# Patient Record
Sex: MALE | Race: WHITE | NOT HISPANIC OR LATINO | Employment: FULL TIME | URBAN - METROPOLITAN AREA
[De-identification: names, ages, dates, MRNs, and addresses within clinical notes are randomized per-mention and may not be internally consistent; named-entity substitution may affect disease eponyms.]

---

## 2017-02-02 ENCOUNTER — OFFICE VISIT - RIVER FALLS (OUTPATIENT)
Dept: FAMILY MEDICINE | Facility: CLINIC | Age: 32
End: 2017-02-02

## 2017-02-02 ASSESSMENT — MIFFLIN-ST. JEOR: SCORE: 1837.54

## 2017-04-26 ENCOUNTER — COMMUNICATION - RIVER FALLS (OUTPATIENT)
Dept: FAMILY MEDICINE | Facility: CLINIC | Age: 32
End: 2017-04-26

## 2017-04-26 ENCOUNTER — OFFICE VISIT - RIVER FALLS (OUTPATIENT)
Dept: FAMILY MEDICINE | Facility: CLINIC | Age: 32
End: 2017-04-26

## 2017-04-26 ASSESSMENT — MIFFLIN-ST. JEOR: SCORE: 1813.04

## 2017-07-24 ENCOUNTER — OFFICE VISIT - RIVER FALLS (OUTPATIENT)
Dept: FAMILY MEDICINE | Facility: CLINIC | Age: 32
End: 2017-07-24

## 2017-07-24 ASSESSMENT — MIFFLIN-ST. JEOR: SCORE: 1800.34

## 2017-10-18 ENCOUNTER — OFFICE VISIT - RIVER FALLS (OUTPATIENT)
Dept: FAMILY MEDICINE | Facility: CLINIC | Age: 32
End: 2017-10-18

## 2017-10-18 ASSESSMENT — MIFFLIN-ST. JEOR: SCORE: 1775.85

## 2017-11-02 ENCOUNTER — RECORDS - HEALTHEAST (OUTPATIENT)
Dept: ADMINISTRATIVE | Facility: OTHER | Age: 32
End: 2017-11-02

## 2017-11-02 ASSESSMENT — MIFFLIN-ST. JEOR: SCORE: 1770.4

## 2017-11-07 ENCOUNTER — SURGERY - HEALTHEAST (OUTPATIENT)
Dept: SURGERY | Facility: CLINIC | Age: 32
End: 2017-11-07

## 2017-11-07 ENCOUNTER — ANESTHESIA - HEALTHEAST (OUTPATIENT)
Dept: SURGERY | Facility: CLINIC | Age: 32
End: 2017-11-07

## 2017-11-07 ASSESSMENT — MIFFLIN-ST. JEOR: SCORE: 1770.4

## 2018-02-05 ENCOUNTER — OFFICE VISIT - RIVER FALLS (OUTPATIENT)
Dept: FAMILY MEDICINE | Facility: CLINIC | Age: 33
End: 2018-02-05

## 2018-02-05 ASSESSMENT — MIFFLIN-ST. JEOR: SCORE: 1881.99

## 2018-04-26 ENCOUNTER — OFFICE VISIT - RIVER FALLS (OUTPATIENT)
Dept: FAMILY MEDICINE | Facility: CLINIC | Age: 33
End: 2018-04-26

## 2018-04-26 ASSESSMENT — MIFFLIN-ST. JEOR: SCORE: 1852.05

## 2018-07-25 ENCOUNTER — OFFICE VISIT - RIVER FALLS (OUTPATIENT)
Dept: FAMILY MEDICINE | Facility: CLINIC | Age: 33
End: 2018-07-25

## 2018-07-25 ASSESSMENT — MIFFLIN-ST. JEOR: SCORE: 1796.71

## 2018-10-31 ENCOUNTER — OFFICE VISIT - RIVER FALLS (OUTPATIENT)
Dept: FAMILY MEDICINE | Facility: CLINIC | Age: 33
End: 2018-10-31

## 2018-10-31 ASSESSMENT — MIFFLIN-ST. JEOR: SCORE: 1806.69

## 2019-04-03 ENCOUNTER — OFFICE VISIT - RIVER FALLS (OUTPATIENT)
Dept: FAMILY MEDICINE | Facility: CLINIC | Age: 34
End: 2019-04-03

## 2019-04-03 ASSESSMENT — MIFFLIN-ST. JEOR: SCORE: 1812.14

## 2019-08-15 ENCOUNTER — OFFICE VISIT - RIVER FALLS (OUTPATIENT)
Dept: FAMILY MEDICINE | Facility: CLINIC | Age: 34
End: 2019-08-15

## 2019-08-15 ASSESSMENT — MIFFLIN-ST. JEOR: SCORE: 1741.37

## 2019-12-26 ENCOUNTER — OFFICE VISIT - RIVER FALLS (OUTPATIENT)
Dept: FAMILY MEDICINE | Facility: CLINIC | Age: 34
End: 2019-12-26

## 2019-12-26 ASSESSMENT — MIFFLIN-ST. JEOR: SCORE: 1773.13

## 2019-12-27 ENCOUNTER — COMMUNICATION - RIVER FALLS (OUTPATIENT)
Dept: FAMILY MEDICINE | Facility: CLINIC | Age: 34
End: 2019-12-27

## 2019-12-27 LAB
A/G RATIO - HISTORICAL: 1.8 (ref 1–2.5)
ALBUMIN SERPL-MCNC: 4.7 GM/DL (ref 3.6–5.1)
ALP SERPL-CCNC: 77 UNIT/L (ref 40–115)
ALT SERPL W P-5'-P-CCNC: 29 UNIT/L (ref 9–46)
AST SERPL W P-5'-P-CCNC: 22 UNIT/L (ref 10–40)
BILIRUB DIRECT SERPL-MCNC: 0.1 MG/DL
BILIRUB INDIRECT SERPL-MCNC: 0.3 MG/DL (ref 0.2–1.2)
BILIRUB SERPL-MCNC: 0.4 MG/DL (ref 0.2–1.2)
GLOBULIN: 2.6 (ref 1.9–3.7)
PROT SERPL-MCNC: 7.3 GM/DL (ref 6.1–8.1)

## 2020-07-23 ENCOUNTER — OFFICE VISIT - RIVER FALLS (OUTPATIENT)
Dept: FAMILY MEDICINE | Facility: CLINIC | Age: 35
End: 2020-07-23

## 2020-07-23 ASSESSMENT — MIFFLIN-ST. JEOR: SCORE: 1807.6

## 2021-02-25 ENCOUNTER — OFFICE VISIT - RIVER FALLS (OUTPATIENT)
Dept: FAMILY MEDICINE | Facility: CLINIC | Age: 36
End: 2021-02-25

## 2021-02-25 ASSESSMENT — MIFFLIN-ST. JEOR: SCORE: 1826.65

## 2021-02-26 ENCOUNTER — COMMUNICATION - RIVER FALLS (OUTPATIENT)
Dept: FAMILY MEDICINE | Facility: CLINIC | Age: 36
End: 2021-02-26

## 2021-02-26 LAB
A/G RATIO - HISTORICAL: 1.9 (ref 1–2.5)
ALBUMIN SERPL-MCNC: 4.7 GM/DL (ref 3.6–5.1)
ALP SERPL-CCNC: 78 UNIT/L (ref 36–130)
ALT SERPL W P-5'-P-CCNC: 24 UNIT/L (ref 9–46)
AST SERPL W P-5'-P-CCNC: 23 UNIT/L (ref 10–40)
BILIRUB DIRECT SERPL-MCNC: 0.1 MG/DL
BILIRUB INDIRECT SERPL-MCNC: 0.2 MG/DL (ref 0.2–1.2)
BILIRUB SERPL-MCNC: 0.3 MG/DL (ref 0.2–1.2)
GLOBULIN: 2.5 (ref 1.9–3.7)
PROT SERPL-MCNC: 7.2 GM/DL (ref 6.1–8.1)

## 2021-05-31 VITALS — WEIGHT: 185 LBS | BODY MASS INDEX: 26.48 KG/M2 | HEIGHT: 70 IN

## 2021-06-14 NOTE — ANESTHESIA PROCEDURE NOTES
Peripheral Block    Patient location during procedure: pre-op  Start time: 11/7/2017 3:30 PM  End time: 11/7/2017 3:35 PM  post-op analgesia per surgeon order as noted in medical record  Staffing:  Performing  Anesthesiologist: VIN ROSALES  Preanesthetic Checklist  Completed: patient identified, site marked, risks, benefits, and alternatives discussed, timeout performed, consent obtained, airway assessed, oxygen available, suction available, emergency drugs available and hand hygiene performed  Peripheral Block  Block type: sciatic, popliteal  Prep: ChloraPrep  Patient position: supine  Patient monitoring: cardiac monitor, continuous pulse oximetry, heart rate and blood pressure  Laterality: left  Injection technique: ultrasound guided    Ultrasound used to visualize needle placement in proximity to nerve being blocked: yes   Permanent ultrasound image captured for medical record    Needle  Needle type: short-bevel   Needle gauge: 21 G  Needle length: 4 in  no peripheral nerve catheter placed  Assessment  Injection assessment: no difficulty with injection, negative aspiration for heme, no paresthesia on injection and incremental injection

## 2021-06-14 NOTE — ANESTHESIA CARE TRANSFER NOTE
Last vitals:   Vitals:    11/07/17 1545   BP: 122/68   Pulse: 80   Resp: 20   Temp:    SpO2: 99%     Patient's level of consciousness is drowsy  Spontaneous respirations: yes  Maintains airway independently: yes  Dentition unchanged: yes  Oropharynx: oropharynx clear of all foreign objects    QCDR Measures:  ASA# 20 - Surgical Safety Checklist: WHO surgical safety checklist completed prior to induction  PQRS# 430 - Adult PONV Prevention: 4558F - Pt received => 2 anti-emetic agents (different classes) preop & intraop  ASA# 8 - Peds PONV Prevention: 4558F - Pt received => 2 anti-emetic agents (different classes) preop & intraop  PQRS# 424 - Billie-op Temp Management: 4559F - At least one body temp DOCUMENTED => 35.5C or 95.9F within required timeframe  PQRS# 426 - PACU Transfer Protocol: - Transfer of care checklist used  ASA# 14 - Acute Post-op Pain: ASA14B - Patient did NOT experience pain >= 7 out of 10

## 2021-06-14 NOTE — ANESTHESIA PREPROCEDURE EVALUATION
Anesthesia Evaluation      Patient summary reviewed     Airway   Mallampati: II   Pulmonary - negative ROS and normal exam                          Cardiovascular - negative ROS and normal exam   Neuro/Psych - negative ROS     Endo/Other - negative ROS      GI/Hepatic/Renal - negative ROS           Dental                         Anesthesia Plan  Planned anesthetic: general LMA and peripheral nerve block    ASA 1     Anesthetic plan and risks discussed with: patient            Yony De Guzman [place]

## 2021-06-14 NOTE — ANESTHESIA POSTPROCEDURE EVALUATION
Patient: Chung Cordero  OPEN REDUCTION INTERNAL FIXATION CALCANEAL FRACTURE LEFT FOOT  Anesthesia type: general    Patient location: PACU  Last vitals:   Vitals:    11/07/17 1922   BP: 118/64   Pulse: 88   Resp: 16   Temp: 37.1  C (98.7  F)   SpO2: 96%     Post vital signs: stable  Level of consciousness: awake and responds to simple questions  Post-anesthesia pain: pain controlled  Post-anesthesia nausea and vomiting: no  Pulmonary: unassisted, return to baseline  Cardiovascular: stable and blood pressure at baseline  Hydration: adequate  Anesthetic events: no    QCDR Measures:  ASA# 11 - Billie-op Cardiac Arrest: ASA11B - Patient did NOT experience unanticipated cardiac arrest  ASA# 12 - Billie-op Mortality Rate: ASA12B - Patient did NOT die  ASA# 13 - PACU Re-Intubation Rate: ASA13B - Patient did NOT require a new airway mgmt  ASA# 10 - Composite Anes Safety: ASA10A - No serious adverse event    Additional Notes:    Yony Ghotra

## 2022-02-12 VITALS
SYSTOLIC BLOOD PRESSURE: 110 MMHG | WEIGHT: 191.6 LBS | HEART RATE: 76 BPM | HEIGHT: 70 IN | BODY MASS INDEX: 27.43 KG/M2 | DIASTOLIC BLOOD PRESSURE: 70 MMHG | TEMPERATURE: 98.3 F

## 2022-02-12 VITALS
WEIGHT: 193.2 LBS | BODY MASS INDEX: 27.66 KG/M2 | HEART RATE: 76 BPM | HEIGHT: 70 IN | SYSTOLIC BLOOD PRESSURE: 118 MMHG | DIASTOLIC BLOOD PRESSURE: 66 MMHG | TEMPERATURE: 98.6 F

## 2022-02-12 VITALS
HEIGHT: 70 IN | SYSTOLIC BLOOD PRESSURE: 124 MMHG | WEIGHT: 199.8 LBS | TEMPERATURE: 98.4 F | BODY MASS INDEX: 28.6 KG/M2 | HEART RATE: 88 BPM | DIASTOLIC BLOOD PRESSURE: 80 MMHG

## 2022-02-12 VITALS
HEART RATE: 80 BPM | HEIGHT: 70 IN | TEMPERATURE: 98 F | SYSTOLIC BLOOD PRESSURE: 118 MMHG | BODY MASS INDEX: 28.26 KG/M2 | DIASTOLIC BLOOD PRESSURE: 76 MMHG | WEIGHT: 197.4 LBS

## 2022-02-12 VITALS
DIASTOLIC BLOOD PRESSURE: 68 MMHG | BODY MASS INDEX: 27.83 KG/M2 | HEIGHT: 70 IN | TEMPERATURE: 98.3 F | WEIGHT: 194.4 LBS | HEART RATE: 78 BPM | SYSTOLIC BLOOD PRESSURE: 124 MMHG

## 2022-02-12 VITALS
DIASTOLIC BLOOD PRESSURE: 78 MMHG | WEIGHT: 209.6 LBS | HEART RATE: 78 BPM | BODY MASS INDEX: 30.01 KG/M2 | TEMPERATURE: 98.4 F | SYSTOLIC BLOOD PRESSURE: 138 MMHG | HEIGHT: 70 IN

## 2022-02-12 VITALS
TEMPERATURE: 98.3 F | BODY MASS INDEX: 29.06 KG/M2 | SYSTOLIC BLOOD PRESSURE: 114 MMHG | WEIGHT: 203 LBS | DIASTOLIC BLOOD PRESSURE: 72 MMHG | HEIGHT: 70 IN | HEART RATE: 80 BPM | RESPIRATION RATE: 16 BRPM

## 2022-02-12 VITALS
TEMPERATURE: 98.1 F | SYSTOLIC BLOOD PRESSURE: 122 MMHG | BODY MASS INDEX: 27.32 KG/M2 | WEIGHT: 190.8 LBS | HEART RATE: 78 BPM | HEIGHT: 70 IN | DIASTOLIC BLOOD PRESSURE: 70 MMHG

## 2022-02-12 VITALS
HEART RATE: 76 BPM | DIASTOLIC BLOOD PRESSURE: 70 MMHG | TEMPERATURE: 98.1 F | WEIGHT: 178.6 LBS | HEIGHT: 70 IN | SYSTOLIC BLOOD PRESSURE: 118 MMHG | BODY MASS INDEX: 25.57 KG/M2

## 2022-02-12 VITALS
WEIGHT: 185.6 LBS | TEMPERATURE: 98.4 F | HEART RATE: 78 BPM | SYSTOLIC BLOOD PRESSURE: 120 MMHG | BODY MASS INDEX: 26.57 KG/M2 | HEIGHT: 70 IN | DIASTOLIC BLOOD PRESSURE: 60 MMHG

## 2022-02-12 VITALS
WEIGHT: 186.2 LBS | HEART RATE: 84 BPM | HEIGHT: 70 IN | DIASTOLIC BLOOD PRESSURE: 82 MMHG | BODY MASS INDEX: 26.66 KG/M2 | SYSTOLIC BLOOD PRESSURE: 118 MMHG | TEMPERATURE: 98.6 F

## 2022-02-12 VITALS
WEIGHT: 194.2 LBS | HEIGHT: 70 IN | SYSTOLIC BLOOD PRESSURE: 120 MMHG | HEART RATE: 78 BPM | BODY MASS INDEX: 27.8 KG/M2 | TEMPERATURE: 98.2 F | DIASTOLIC BLOOD PRESSURE: 68 MMHG

## 2022-02-12 VITALS
TEMPERATURE: 98.2 F | WEIGHT: 193 LBS | HEART RATE: 76 BPM | BODY MASS INDEX: 27.63 KG/M2 | DIASTOLIC BLOOD PRESSURE: 76 MMHG | HEIGHT: 70 IN | SYSTOLIC BLOOD PRESSURE: 130 MMHG

## 2022-02-16 NOTE — TELEPHONE ENCOUNTER
---------------------  From: Can Bowers MD   To: Phone Messages Pool ([id number]_[facility]); Charisma Nieto MA;     Sent: 2/17/2021 2:21:54 PM CST  Subject: RE: General Message     Yes      ---------------------  From: Charisma Jacobs ([facility] Message Pool ([id number]_[facility]))   To: Can Bowers MD;     Sent: 2/17/2021 1:36:00 PM CST  Subject: FW: General Message           ---------------------  From: Cristina Rodríguez   To: [employer] Message Pool ([id number]);     Sent: 2/17/2021 1:21:08 PM CST  Subject: General Message     This pt is scheduled for a Suboxone check - he has not been seen since July of 2020 - is he still ok to be seen for the Suboxone?---------------------  From: Tamika Stevens LPN (Phone Messages Pool ([id number]_[facility]))   To: Appointment Pool ([id number]_[place]);     Sent: 2/17/2021 2:25:53 PM CST  Subject: FW: General Message---------------------  From: Charisma Jacobs   To: [employer] Message Pool ([id number]);     Sent: 2/18/2021 9:03:36 AM CST  Subject: FW: General Message

## 2022-02-16 NOTE — PROGRESS NOTES
Patient:   FLORY MEANS            MRN: [id number]            FIN: [id number]               Age:   33 years     Sex:  Male     :  1985   Associated Diagnoses:   Chemical Dependency; Bipolar Disorder   Author:   Can Bowers MD      Visit Information   Opiate addiction with oxycontin and percocet. Started Suboxone 2008 at 24mg daily. Tried to taper in 2009 but returned to 24mg daily. Became fatigued with first taper. Started taper from 16mg in 2011.    Admitted marijuana use 2013  Random urine: normal 2013; 2013; May 2014; 2014      Date of Service: 10/31/2018 03:34 pm  Performing Location: [facility]  Encounter#: [id number]      Primary Care Provider (PCP):  Can Bowers MD    NPI# [id number]      Referring Provider:  Can Bowers MD    NPI# [id number]      Chief Complaint   10/31/2018 3:35 PM CDT   Suboxone check      History of Present Illness   Cuts each tab into 14 pieces and takes two pieces a day (1 in morning and 1 at night).Taking less than 0.5 mg daily. Has adjusted to the dose. Working later now that not working for himself. Hours at work are 8-5pm. Took suboxone today.  Restarted 2mg 2014.   Feels better on wellbutrin and lamictal as opposed to the prozac and lithium. Not having the ups and downs throughout the day as much.  Smoking 1/2 ppd.  Mind racing controlled with lamictal. Temper decreased with wellbutrin.  Back to exercising.  Started  working for dealership. Now joined [employer] and working [employer] 2018.    Consulted with Dr Lama (May 11th, 2015) and recommends inpatient treatment to start vivitrol.     Mom (Magdalena Means) present for the visit 2015    Needed percocet and vicodin for postoperative pain related to Calcaneus fracture. Happened 2017. Now back to full activities         Review of Systems   Constitutional:  sleeping good.    Gastrointestinal:   No abdominal pain.    Neurologic:  No headache.    Psychiatric:  mom has noticed he gets distracted and gets distracted at work; did fine in school growing up; has used a light in past for seasonal affective disorder.      PHQ-9: 5pts (October 2018). 5pts (October 2017). 3pts (April 2017). 5pts (March 2016). 3pts (January 2015); 11pts (May 2014); 5pts (May 2013). 8pts April 2012; 7 pts February 2012. 5pts November 2011. 6 pts (July 2011). 13pts (May 2011) . 5pts March 2010  MDQ:  0pts and no problem (October 2018). 2pts and no problem (October 2017). 1pt and no problem (March 2016). 0pts and minor problem (March 2015). 1 pt and no problem(January 2015). 1pt and minor problem (May 2014).  2pts (May 2013). 2pts April 2012; 1pt February 2012. 2pts May 2011. 2pts March 2010  DAVON-7: 1pt (October 2018). 4pts (October 2017)      Health Status   Allergies:    Allergic Reactions (Selected)  No known allergies   Medications:  (Selected)   Prescriptions  Prescribed  Suboxone 2 mg-0.5 mg sublingual film: 0.125-0.25 EA, sl, daily, # 4 EA, 2 Refill(s), Type: Maintenance, Pharmacy: [facility], Called to [facility], 0.125-0.25 EA SL daily  buPROPion 300 mg/24 hours (XL) oral tablet, extended release: = 1 tab(s) ( 300 mg ), po, q 24 hrs, # 95 tab(s), 3 Refill(s), Type: Maintenance, Pharmacy: [facility], 1 tab(s) Oral q 24 hrs  lamoTRIgine 200 mg oral tablet: = 1 tab(s) ( 200 mg ), po, daily, # 95 tab(s), 3 Refill(s), Type: Maintenance, Pharmacy: [facility], 1 tab(s) Oral daily   Problem list:    All Problems  Chemical dependency / SNOMED CT 669037452 / Confirmed  Bipolar disorder / SNOMED CT 1656532323 / Confirmed  Obese / SNOMED CT 2050570616 / Probable  Recurrent depressive disorder / SNOMED CT 118551304 / Confirmed  Resolved: Right middle finger tendon repair      Histories   Procedure history:    Right middle finger tendon repair in the month of 12/2005 at 20 Years.     Family History:  "Parents alive and healthy. Sister healthy (two children)    Social History: Works for [employer] ([employer]) April 2018. 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  and wife has history of meth abuse in high school but clean since 2001. Son born 2012. Have been trying to have a child for a few years      Physical Examination   Vital Signs   10/31/2018 3:35 PM CDT Temperature Tympanic 98.2 DegF    Peripheral Pulse Rate 76 bpm    Pulse Site Radial artery    HR Method Manual    Systolic Blood Pressure 130 mmHg    Diastolic Blood Pressure 76 mmHg    Mean Arterial Pressure 94 mmHg    BP Site Right arm    BP Method Manual      Measurements from flowsheet : Measurements   10/31/2018 3:35 PM CDT Height Measured - Standard 70 in    Weight Measured - Standard 193 lb    BSA 2.08 m2    Body Mass Index 27.69 kg/m2  HI      General:  Alert and oriented, No acute distress.    Musculoskeletal:  Normal gait.    Psychiatric:  Appropriate mood & affect.       Review / Management   Normal LFT July 2017  Reviewed [place] Drug Px Monitoring Program (October 2018)  Urine drug screen: negative for buprenorphines since on such low dose      Impression and Plan   Diagnosis     Chemical Dependency (TIH34-ZW F19.20).     Bipolar Disorder (WTD65-TJ F31.9).       Chemical Dependency: continue 4 tabs today for 4 months. Consider reduction to 3 tabs in 2019. Has decreased from 15 tabs a month (March 2016) and 5 tabs a month (July 2017). Will check urine test 1-2x a year. Check LFT's in November  Bipolar Disorder: continue lamictal and wellbutrin as going well. Decreased Wellbutrin to 300mg daily July 2017 and doing well. Stopped prozac September 2014 (had been on it since 2011) and lithium.    Continues to lose weight from high of 250 lbs. Gained weight with nonweight bearing status for 2 months.  "

## 2022-02-16 NOTE — NURSING NOTE
Comprehensive Intake Entered On:  4/3/2019 5:08 PM CDT    Performed On:  4/3/2019 5:04 PM CDT by Bette Sims CMA               Summary   Chief Complaint :   Medication management- Suboxone   Weight Measured :   194.2 lb(Converted to: 194 lb 3 oz, 88.09 kg)    Height Measured :   70 in(Converted to: 5 ft 10 in, 177.80 cm)    Body Mass Index :   27.86 kg/m2 (HI)    Body Surface Area :   2.08 m2   Systolic Blood Pressure :   120 mmHg   Diastolic Blood Pressure :   68 mmHg   Mean Arterial Pressure :   85 mmHg   Peripheral Pulse Rate :   78 bpm   BP Site :   Right arm   Pulse Site :   Radial artery   BP Method :   Manual   HR Method :   Manual   Temperature Tympanic :   98.2 DegF(Converted to: 36.8 DegC)    Bette Sims CMA - 4/3/2019 5:04 PM CDT   Health Status   Allergies Verified? :   Yes   Medication History Verified? :   Yes   Medical History Verified? :   No   Pre-Visit Planning Status :   Completed   Tobacco Use? :   Current every day smoker   Bette Sims CMA - 4/3/2019 5:04 PM CDT   Demographics   Last Name :   Rajanjuwan   Address :   [address] [place] [address]    First Name :   Chung   Kat Initial :   L   Responsible Party Date of Birth () :   1985 CDT   City :   [place]   State :   [place]   Zip Code :   [address]   Bette Sims CMA - 4/3/2019 5:04 PM CDT   Providers Grid   Provider Name :    Rony Rosas              Provider Specialty :    Counselor at [facility]              Comments :    # [phone]                Bette Sims CMA - 4/3/2019 5:04 PM CDT         Meds / Allergies   (As Of: 4/3/2019 5:08:10 PM CDT)   Allergies (Active)   No known allergies  Estimated Onset Date:   Unspecified ; Created By:   Aurora Garcia; Reaction Status:   Active ; Category:   Drug ; Substance:   No known allergies ; Type:   Allergy ; Updated By:   Aurora Garcia; Reviewed Date:   4/3/2019 5:06 PM CDT        Medication List   (As Of: 4/3/2019 5:08:10 PM CDT)    Prescription/Discharge Order    buprenorphine-naloxone  :   buprenorphine-naloxone ; Status:   Prescribed ; Ordered As Mnemonic:   Suboxone 2 mg-0.5 mg sublingual film ; Simple Display Line:   0.125-0.25 EA, sl, daily, 4 EA, 3 Refill(s) ; Ordering Provider:   Can Bowers MD; Catalog Code:   buprenorphine-naloxone ; Order Dt/Tm:   10/31/2018 3:57:37 PM          buPROPion  :   buPROPion ; Status:   Prescribed ; Ordered As Mnemonic:   buPROPion 300 mg/24 hours (XL) oral tablet, extended release ; Simple Display Line:   300 mg, 1 tab(s), po, q 24 hrs, 95 tab(s), 3 Refill(s) ; Ordering Provider:   Can Bowers MD; Catalog Code:   buPROPion ; Order Dt/Tm:   9/5/2018 12:28:59 PM          lamoTRIgine  :   lamoTRIgine ; Status:   Prescribed ; Ordered As Mnemonic:   lamoTRIgine 200 mg oral tablet ; Simple Display Line:   200 mg, 1 tab(s), po, daily, 95 tab(s), 3 Refill(s) ; Ordering Provider:   Can Bowers MD; Catalog Code:   lamoTRIgine ; Order Dt/Tm:   9/5/2018 12:29:17 PM

## 2022-02-16 NOTE — PROGRESS NOTES
Patient:   FLORY MEANS            MRN: [id number]            FIN: [id number]               Age:   32 years     Sex:  Male     :  1985   Associated Diagnoses:   Chemical Dependency; Bipolar Disorder   Author:   Can Bowers MD      Visit Information   Opiate addiction with oxycontin and percocet. Started Suboxone 2008 at 24mg daily. Tried to taper in 2009 but returned to 24mg daily. Became fatigued with first taper. Started taper from 16mg in 2011.    Admitted marijuana use 2013  Random urine: normal 2013; 2013; May 2014; 2014      Date of Service: 2018 08:32 am  Performing Location: [facility]  Encounter#: [id number]      Primary Care Provider (PCP):  Can Bowers MD    NPI# [id number]      Referring Provider:  Can Bowers MD    NPI# [id number]      Chief Complaint   2018 8:34 AM CST     Suboxone check        History of Present Illness   Cuts each tab into 14 pieces and takes 2 pieces a day (1 in morning and 1 at night).Taking less than 0.5 mg daily. Has adjusted to the dose. Working later now that not working for himself. Hours at work are 8-5pm. Currently off work. Took suboxone today.  Restarted 2mg 2014.   Feels better on wellbutrin and lamictal as opposed to the prozac and lithium. Not having the ups and downs throughout the day as much.  Smoking 3/4ppd.  Mind racing controlled with lamictal. Temper decreased with wellbutrin.  Back to exercising.  Started  working for dealership. Now working at [employer]    Consulted with Dr Lama (May 11th, 2015) and recommends inpatient treatment to start vivitrol.     Mom (Magdalena Means) present for the visit 2015    Needed percocet and vicodin for postoperative pain related to Calcaneus fracture. Happened 2017 and not currently back to work.         Review of Systems   Constitutional:  sleeping good.    Gastrointestinal:  No abdominal  "pain.    Neurologic:  No headache.    Psychiatric:  mom has noticed he gets distracted and gets distracted at work; did fine in school growing up; has used a light in past for seasonal affective disorder.      PHQ-9: 5pts (October 2017). 3pts (April 2017). 5pts (March 2016). 3pts (January 2015); 11pts (May 2014); 5pts (May 2013). 8pts April 2012; 7 pts February 2012. 5pts November 2011. 6 pts (July 2011). 13pts (May 2011) . 5pts March 2010  MDQ:  2pts and no problem (October 2017). 1pt and no problem (March 2016). 0pts and minor problem (March 2015). 1 pt and no problem(January 2015). 1pt and minor problem (May 2014).  2pts (May 2013). 2pts April 2012; 1pt February 2012. 2pts May 2011. 2pts March 2010  DAVON-7: 4pts (October 2017)      Health Status   Allergies:    Allergic Reactions (Selected)  No known allergies   Medications:  (Selected)   Prescriptions  Prescribed  Suboxone 2 mg-0.5 mg sublingual film: 0.125-0.25 EA, sl, daily, # 4 EA, 2 Refill(s), Type: Maintenance, Pharmacy: [facility], [facility], 0.125-0.25 EA sl daily  buPROPion 300 mg/24 hours (XL) oral tablet, extended release: 1 tab(s) ( 300 mg ), po, q 24 hrs, # 30 tab(s), 1 Refill(s), Type: Maintenance, Pharmacy: [id number], 1 tab(s) po q 24 hrs  lamoTRIgine 200 mg oral tablet: 1 tab(s) ( 200 mg ), po, daily, # 95 tab(s), 3 Refill(s), Type: Maintenance, Pharmacy: [facility], 1 tab(s) po daily   Problem list:    All Problems  Chemical dependency / SNOMED CT 779803287 / Confirmed  Bipolar disorder / SNOMED CT 5501800458 / Confirmed  Obese / SNOMED CT 1463213580 / Probable  Recurrent depressive disorder / SNOMED CT 865238177 / Confirmed  Resolved: Right middle finger tendon repair      Histories   Procedure history:    Right middle finger tendon repair in the month of 12/2005 at 20 Years.   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,  and wife has " history of meth abuse in high school but clean since 2001      Physical Examination   Vital Signs   2/5/2018 8:34 AM CST Temperature Tympanic 98.4 DegF    Peripheral Pulse Rate 78 bpm    Pulse Site Radial artery    HR Method Manual    Systolic Blood Pressure 138 mmHg  HI    Diastolic Blood Pressure 78 mmHg    Mean Arterial Pressure 98 mmHg    BP Site Right arm    BP Method Manual      Measurements from flowsheet : Measurements   2/5/2018 8:34 AM CST Height Measured - Standard 70 in    Weight Measured - Standard 209.6 lb    BSA 2.16 m2    Body Mass Index 30.07 kg/m2  HI      General:  Alert and oriented, No acute distress.    Musculoskeletal:  Normal gait.    Psychiatric:  Appropriate mood & affect.       Review / Management   Normal LFT July 2017  Reviewed [place] Drug Px Monitoring Program (February 2018)  Urine drug screen: positive for buprenorphines (July 2017)      Impression and Plan   Diagnosis     Chemical Dependency (FWM23-VY F19.20).     Bipolar Disorder (IQL74-NW F31.9).       Chemical Dependency: continue 4 tabs today for 3 months. Consider reduction to 3 tabs in 2018. Has decreased from 15 tabs a month (March 2016).   Bipolar Disorder: continue lamictal and wellbutrin as going well. Decrease Wellbutrin to 300mg daily. Stopped prozac September 2014 (had been on it since 2011) and lithium.    Continues to lose weight from high of 250 lbs. Gained weight with nonweight bearing status for 2 months.

## 2022-02-16 NOTE — NURSING NOTE
Comprehensive Intake Entered On:  7/23/2020 3:54 PM CDT    Performed On:  7/23/2020 3:51 PM CDT by Bette Sims CMA               Summary   Chief Complaint :   Suboxone check   Weight Measured :   193.2 lb(Converted to: 193 lb 3 oz, 87.63 kg)    Height Measured :   70 in(Converted to: 5 ft 10 in, 177.80 cm)    Body Mass Index :   27.72 kg/m2 (HI)    Body Surface Area :   2.08 m2   Systolic Blood Pressure :   118 mmHg   Diastolic Blood Pressure :   66 mmHg   Mean Arterial Pressure :   83 mmHg   Peripheral Pulse Rate :   76 bpm   BP Site :   Right arm   Pulse Site :   Radial artery   BP Method :   Manual   HR Method :   Manual   Temperature Tympanic :   98.6 DegF(Converted to: 37.0 DegC)    Bette Sims CMA - 7/23/2020 3:51 PM CDT   Health Status   Allergies Verified? :   Yes   Medication History Verified? :   Yes   Medical History Verified? :   No   Pre-Visit Planning Status :   Completed   Tobacco Use? :   Current every day smoker   Bette Sims CMA - 7/23/2020 3:51 PM CDT   Consents   Consent for Immunization Exchange :   Consent Granted   Consent for Immunizations to Providers :   Consent Granted   Bette Sims CMA - 7/23/2020 3:51 PM CDT   Meds / Allergies   (As Of: 7/23/2020 3:54:55 PM CDT)   Allergies (Active)   No known allergies  Estimated Onset Date:   Unspecified ; Created By:   Aurora Garcia; Reaction Status:   Active ; Category:   Drug ; Substance:   No known allergies ; Type:   Allergy ; Updated By:   Aurora Garcia; Reviewed Date:   7/23/2020 3:52 PM CDT        Medication List   (As Of: 7/23/2020 3:54:55 PM CDT)   Prescription/Discharge Order    buprenorphine-naloxone  :   buprenorphine-naloxone ; Status:   Prescribed ; Ordered As Mnemonic:   Suboxone 2 mg-0.5 mg sublingual film ; Simple Display Line:   0.075-0.25 EA, sl, daily, 3 EA, 5 Refill(s) ; Ordering Provider:   Can Bowers MD; Catalog Code:   buprenorphine-naloxone ; Order Dt/Tm:   12/26/2019 4:07:12 PM CST           buPROPion  :   buPROPion ; Status:   Prescribed ; Ordered As Mnemonic:   buPROPion 300 mg/24 hours (XL) oral tablet, extended release ; Simple Display Line:   300 mg, 1 tab(s), po, q 24 hrs, 95 tab(s), 3 Refill(s) ; Ordering Provider:   Can Bowers MD; Catalog Code:   buPROPion ; Order Dt/Tm:   8/15/2019 4:37:08 PM CDT          lamoTRIgine  :   lamoTRIgine ; Status:   Prescribed ; Ordered As Mnemonic:   lamoTRIgine 200 mg oral tablet ; Simple Display Line:   200 mg, 1 tab(s), po, daily, 95 tab(s), 3 Refill(s) ; Ordering Provider:   Can Bowers MD; Catalog Code:   lamoTRIgine ; Order Dt/Tm:   8/15/2019 4:37:01 PM CDT            ID Risk Screen   Recent Travel History :   No recent travel   Family Member Travel History :   No recent travel   Other Exposure to Infectious Disease :   Unknown   Bette Sims CMA - 7/23/2020 3:51 PM CDT   Procedures / Surgeries        -    Procedure History   (As Of: 7/23/2020 3:54:55 PM CDT)     Procedure Dt/Tm:   12/2005 ; Anesthesia Minutes:   0 ; Procedure Name:   Right middle finger tendon repair ; Procedure Minutes:   0

## 2022-02-16 NOTE — PROGRESS NOTES
Patient:   FLORY MEANS            MRN: [id number]            FIN: [id number]               Age:   32 years     Sex:  Male     :  1985   Associated Diagnoses:   Chemical Dependency; Bipolar Disorder   Author:   Can Bowers MD      Visit Information   Opiate addiction with oxycontin and percocet. Started Suboxone 2008 at 24mg daily. Tried to taper in 2009 but returned to 24mg daily. Became fatigued with first taper. Started taper from 16mg in 2011.    Admitted marijuana use 2013  Random urine: normal 2013; 2013; May 2014; 2014      Date of Service: 2017 03:28 pm  Performing Location: [facility]  Encounter#: [id number]      Primary Care Provider (PCP):  Can Bowers MD    NPI# [id number]      Referring Provider:  Can Bowers MD    NPI# [id number]      Chief Complaint   2017 3:41 PM CDT    suboxone recheck.      History of Present Illness   Cuts each tab into 6 pieces and takes 1 piece a day (1/2 in morning and 1/2 at night).Taking less than 0.5 mg daily. Has adjusted to the dose.Working later now that not working for himself. Hours at work are 8-5pm. Job going well. Took suboxone today.  Restarted 2mg 2014.   Feels better on wellbutrin and lamictal as opposed to the prozac and lithium. Not having the ups and downs throughout the day as much.  Tried patches and cut down to 1/2 ppd but now back to 1ppd.  Mind racing controlled with lamictal. Temper decreased with wellbutrin.  Exercises every morning and feels that helps him mentally. Continues to lose weight.  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     Mom (Magdalena Means) present for the visit 2015         Review of Systems   Constitutional:  sleeping good.    Gastrointestinal:  No abdominal pain.    Neurologic:  No headache.     Psychiatric:  mom has noticed he gets distracted and gets distracted at work; did fine in school growing up; has used a light in past for seasonal affective disorder.      PHQ-9: 3pts (April 2017). 5pts (March 2016). 3pts (January 2015); 11pts (May 2014); 5pts (May 2013). 8pts April 2012; 7 pts February 2012. 5pts November 2011. 6 pts (July 2011). 13pts (May 2011) . 5pts March 2010  MDQ:  1pt and no problem (March 2016). 0pts and minor problem (March 2015). 1 pt and no problem(January 2015). 1pt and minor problem (May 2014).  2pts (May 2013). 2pts April 2012; 1pt February 2012. 2pts May 2011. 2pts March 2010             Health Status   Allergies:    Allergic Reactions (Selected)  No known allergies   Medications:  (Selected)   Prescriptions  Prescribed  Suboxone 2 mg-0.5 mg sublingual film: 0.125-0.25 EA, sl, daily, # 5 EA, 2 Refill(s), Type: Maintenance, [facility], [facility], 0.125-0.25 EA sl daily  buPROPion 150 mg/24 hours (XL) oral tablet, extended release: 3 tab(s) ( 450 mg ), po, q 24 hrs, # 280 tab(s), 3 Refill(s), Type: Maintenance, Pharmacy: [facility], 3 tab(s) po q 24 hrs  lamoTRIgine 200 mg oral tablet: 1 tab(s) ( 200 mg ), po, daily, Inst[facility], # 95 tab(s), 3 Refill(s), Type: Maintenance, Pharmacy: [facility], 1 tab(s) po daily,[facility]   Problem list:    All Problems  Chemical dependency / SNOMED CT 245870795 / Confirmed  Bipolar disorder / SNOMED CT 0434501952 / Confirmed  Obese / SNOMED CT 9509658686 / Probable  Recurrent depressive disorder / SNOMED CT 202328223 / Confirmed  Resolved: Right middle finger tendon repair      Histories   Procedure history:    Right middle finger tendon repair in the month of 12/2005 at 20 Years.   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    Musculoskeletal:  Normal gait.    Psychiatric:  Appropriate mood & affect.       Review / Management   Normal LFT May 2016  Reviewed [place] Drug Px Monitoring Program (July 2017)  Urine drug screen: positive for buprenorphines (July 2017)         Impression and Plan   Diagnosis     Chemical Dependency (GTL23-UO F19.20).     Bipolar Disorder (TVB47-BD F31.9).       Chemical Dependency: decrease to 4 tabs today (switched jobs recently) for 3 months. Consider reduction to 3 tabs in 2018. Follow up end of October. Has decreased from 15 tabs a month a year ago (March 2016)  Bipolar Disorder: continue lamictal and wellbutrin as going well. Decrease Wellbutrin to 300mg daily. Stopped prozac September 2014 (had been on it since 2011) and lithium.

## 2022-02-16 NOTE — PROGRESS NOTES
Patient:   FLORY MEANS            MRN: [id number]            FIN: [id number]               Age:   33 years     Sex:  Male     :  1985   Associated Diagnoses:   Chemical Dependency; Bipolar Disorder   Author:   Can Bowers MD      Visit Information   Opiate addiction with oxycontin and percocet. Started Suboxone 2008 at 24mg daily. Tried to taper in 2009 but returned to 24mg daily. Became fatigued with first taper. Started taper from 16mg in 2011.    Admitted marijuana use 2013  Random urine: normal 2013; 2013; May 2014; 2014      Date of Service: 2018 12:49 pm  Performing Location: [facility]  Encounter#: [id number]      Primary Care Provider (PCP):  Can Bowers MD    NPI# [id number]      Referring Provider:  Can Bowers MD    NPI# [id number]      Chief Complaint   2018 12:53 PM CDT   Pt here for a Suboxone recheck.        History of Present Illness   Cuts each tab into 14 pieces and takes 2 pieces a day (1 in morning and 1 at night).Taking less than 0.5 mg daily. Has adjusted to the dose. Working later now that not working for himself. Hours at work are 8-5pm. Currently off work. Took suboxone today.  Restarted 2mg 2014.   Feels better on wellbutrin and lamictal as opposed to the prozac and lithium. Not having the ups and downs throughout the day as much.  Smoking 3/4ppd.  Mind racing controlled with lamictal. Temper decreased with wellbutrin.  Back to exercising.  Started  working for dealership. Now working at the [employer]. 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    Consulted with Dr Lama (May 11th, 2015) and recommends inpatient treatment to start vivitrol.     Mom (Magdalena Means) present for the visit 2015    Needed percocet and vicodin for postoperative pain related to Calcaneus fracture. Happened 2017. Now back to full activities         Review  of Systems   Constitutional:  sleeping good.    Gastrointestinal:  No abdominal pain.    Neurologic:  No headache.    Psychiatric:  mom has noticed he gets distracted and gets distracted at work; did fine in school growing up; has used a light in past for seasonal affective disorder.      PHQ-9: 5pts (October 2017). 3pts (April 2017). 5pts (March 2016). 3pts (January 2015); 11pts (May 2014); 5pts (May 2013). 8pts April 2012; 7 pts February 2012. 5pts November 2011. 6 pts (July 2011). 13pts (May 2011) . 5pts March 2010  MDQ:  2pts and no problem (October 2017). 1pt and no problem (March 2016). 0pts and minor problem (March 2015). 1 pt and no problem(January 2015). 1pt and minor problem (May 2014).  2pts (May 2013). 2pts April 2012; 1pt February 2012. 2pts May 2011. 2pts March 2010  DAVON-7: 4pts (October 2017)      Health Status   Allergies:    Allergic Reactions (Selected)  No known allergies   Medications:  (Selected)   Prescriptions  Prescribed  Suboxone 2 mg-0.5 mg sublingual film: 0.125-0.25 EA, sl, daily, # 4 EA, 2 Refill(s), Type: Maintenance, Pharmacy: [facility], [facility], 0.125-0.25 EA sl daily  buPROPion 300 mg/24 hours (XL) oral tablet, extended release: 1 tab(s) ( 300 mg ), po, q 24 hrs, # 30 tab(s), 1 Refill(s), Type: Maintenance, Pharmacy: [id number], 1 tab(s) po q 24 hrs  lamoTRIgine 200 mg oral tablet: 1 tab(s) ( 200 mg ), po, daily, # 95 tab(s), 3 Refill(s), Type: Maintenance, Pharmacy: [facility], 1 tab(s) po daily   Problem list:    All Problems  Chemical dependency / SNOMED CT 172055205 / Confirmed  Bipolar disorder / SNOMED CT 9885840373 / Confirmed  Obese / SNOMED CT 6188011897 / Probable  Recurrent depressive disorder / SNOMED CT 691035057 / Confirmed  Resolved: Right middle finger tendon repair      Histories   Procedure history:    Right middle finger tendon repair in the month of 12/2005 at 20 Years.   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 \"[employer]\" in September 2010,  and wife has history of meth abuse in high school but clean since 2001      Physical Examination   Vital Signs   4/26/2018 12:53 PM CDT Temperature Tympanic 98.3 DegF    Peripheral Pulse Rate 80 bpm    Pulse Site Radial artery    Respiratory Rate 16 br/min    Systolic Blood Pressure 114 mmHg    Diastolic Blood Pressure 72 mmHg    Mean Arterial Pressure 86 mmHg    BP Site Right arm      Measurements from flowsheet : Measurements   4/26/2018 12:53 PM CDT Height Measured - Standard 70 in    Weight Measured - Standard 203 lb    BSA 2.13 m2    Body Mass Index 29.12 kg/m2  HI      General:  Alert and oriented, No acute distress.    Musculoskeletal:  Normal gait.    Psychiatric:  Appropriate mood & affect.       Review / Management   Normal LFT July 2017  Reviewed [place] Drug Px Monitoring [place] (April 2018)  Urine drug screen: positive for buprenorphines (February 2018)      Impression and Plan   Diagnosis     Chemical Dependency (LHM77-YQ F19.20).     Bipolar Disorder (EOA88-IW F31.9).       Chemical Dependency: continue 4 tabs today for 3 months. Consider reduction to 3 tabs in 2018. Has decreased from 15 tabs a month (March 2016).  No urine test today and plan recheck July  Bipolar Disorder: continue lamictal and wellbutrin as going well. Decrease Wellbutrin to 300mg daily. Stopped prozac September 2014 (had been on it since 2011) and lithium.    Continues to lose weight from high of 250 lbs. Gained weight with nonweight bearing status for 2 months.  "

## 2022-02-16 NOTE — NURSING NOTE
Comprehensive Intake Entered On:  2/25/2021 3:44 PM CST    Performed On:  2/25/2021 3:39 PM CST by Bette Sims CMA               Summary   Chief Complaint :   Suboxone check   Weight Measured :   197.4 lb(Converted to: 197 lb 6 oz, 89.539 kg)    Height Measured :   70 in(Converted to: 5 ft 10 in, 177.80 cm)    Body Mass Index :   28.32 kg/m2 (HI)    Body Surface Area :   2.1 m2   Systolic Blood Pressure :   118 mmHg   Diastolic Blood Pressure :   76 mmHg   Mean Arterial Pressure :   90 mmHg   Peripheral Pulse Rate :   80 bpm   BP Site :   Right arm   Pulse Site :   Radial artery   BP Method :   Manual   HR Method :   Manual   Temperature Tympanic :   98 DegF(Converted to: 36.7 DegC)    Bette Sims CMA - 2/25/2021 3:39 PM CST   Health Status   Allergies Verified? :   Yes   Medication History Verified? :   Yes   Medical History Verified? :   No   Pre-Visit Planning Status :   Not completed   Tobacco Use? :   Current every day smoker   Bette Sims CMA - 2/25/2021 3:39 PM CST   Consents   Consent for Immunization Exchange :   Consent Granted   Consent for Immunizations to Providers :   Consent Granted   Bette Sims CMA - 2/25/2021 3:39 PM CST   Meds / Allergies   (As Of: 2/25/2021 3:44:14 PM CST)   Allergies (Active)   No known allergies  Estimated Onset Date:   Unspecified ; Created By:   Aurora Garcia; Reaction Status:   Active ; Category:   Drug ; Substance:   No known allergies ; Type:   Allergy ; Updated By:   Aurora Garcia; Reviewed Date:   2/25/2021 3:41 PM CST        Medication List   (As Of: 2/25/2021 3:44:14 PM CST)   Prescription/Discharge Order    buprenorphine-naloxone  :   buprenorphine-naloxone ; Status:   Prescribed ; Ordered As Mnemonic:   Suboxone 2 mg-0.5 mg sublingual film ; Simple Display Line:   0.075-0.25 EA, sl, daily, 3 EA, 6 Refill(s) ; Ordering Provider:   Can Bowers MD; Catalog Code:   buprenorphine-naloxone ; Order Dt/Tm:   7/23/2020 4:09:29 PM CDT           buPROPion  :   buPROPion ; Status:   Prescribed ; Ordered As Mnemonic:   buPROPion 300 mg/24 hours (XL) oral tablet, extended release ; Simple Display Line:   300 mg, 1 tab(s), po, q 24 hrs, 95 tab(s), 3 Refill(s) ; Ordering Provider:   Can Bowers MD; Catalog Code:   buPROPion ; Order Dt/Tm:   8/19/2020 10:06:56 AM CDT          lamoTRIgine  :   lamoTRIgine ; Status:   Prescribed ; Ordered As Mnemonic:   lamoTRIgine 200 mg oral tablet ; Simple Display Line:   200 mg, 1 tab(s), po, daily, 95 tab(s), 3 Refill(s) ; Ordering Provider:   Can Bowers MD; Catalog Code:   lamoTRIgine ; Order Dt/Tm:   8/19/2020 10:06:44 AM CDT            ID Risk Screen   Recent Travel History :   No recent travel   Family Member Travel History :   No recent travel   Other Exposure to Infectious Disease :   Unknown   COVID-19 Testing Status :   No positive COVID-19 test   Bette Sims CMA - 2/25/2021 3:39 PM CST   Social History   Social History   (As Of: 2/25/2021 3:44:14 PM CST)   Alcohol:        Current, 0-1 time per week, 3 drinks/episode average.  4 drinks/episode maximum.   (Last Updated: 11/7/2018 4:04:24 PM CST by Cris Chiang)          Tobacco:        10 or more cigarettes (1/2 pack or more)/day in last 30 days, Cigarettes   (Last Updated: 2/25/2021 3:40:10 PM CST by Bette Sims CMA)          Electronic Cigarette/Vaping:        Electronic Cigarette Use: Never.   (Last Updated: 2/25/2021 3:40:17 PM CST by Bette Sims CMA)

## 2022-02-16 NOTE — NURSING NOTE
Comprehensive Intake Entered On:  8/15/2019 4:26 PM CDT    Performed On:  8/15/2019 4:22 PM CDT by Bette Sims CMA               Summary   Chief Complaint :   Suboxone check   Weight Measured :   178.6 lb(Converted to: 178 lb 10 oz, 81.01 kg)    Height Measured :   70 in(Converted to: 5 ft 10 in, 177.80 cm)    Body Mass Index :   25.62 kg/m2 (HI)    Body Surface Area :   2 m2   Systolic Blood Pressure :   118 mmHg   Diastolic Blood Pressure :   70 mmHg   Mean Arterial Pressure :   86 mmHg   Peripheral Pulse Rate :   76 bpm   BP Site :   Right arm   Pulse Site :   Radial artery   BP Method :   Manual   HR Method :   Manual   Temperature Tympanic :   98.1 DegF(Converted to: 36.7 DegC)    Bette Sims CMA - 8/15/2019 4:22 PM CDT   Health Status   Allergies Verified? :   Yes   Medication History Verified? :   Yes   Medical History Verified? :   No   Pre-Visit Planning Status :   Completed   Tobacco Use? :   Current every day smoker   Bette Sims CMA - 8/15/2019 4:22 PM CDT   Meds / Allergies   (As Of: 8/15/2019 4:26:07 PM CDT)   Allergies (Active)   No known allergies  Estimated Onset Date:   Unspecified ; Created By:   Aurora Garcia; Reaction Status:   Active ; Category:   Drug ; Substance:   No known allergies ; Type:   Allergy ; Updated By:   Aurora Garcia; Reviewed Date:   8/15/2019 4:24 PM CDT        Medication List   (As Of: 8/15/2019 4:26:07 PM CDT)   Prescription/Discharge Order    buprenorphine-naloxone  :   buprenorphine-naloxone ; Status:   Prescribed ; Ordered As Mnemonic:   Suboxone 2 mg-0.5 mg sublingual film ; Simple Display Line:   0.075-0.25 EA, sl, daily, 3 EA, 3 Refill(s) ; Ordering Provider:   Can Bowers MD; Catalog Code:   buprenorphine-naloxone ; Order Dt/Tm:   4/3/2019 5:25:53 PM          buPROPion  :   buPROPion ; Status:   Prescribed ; Ordered As Mnemonic:   buPROPion 300 mg/24 hours (XL) oral tablet, extended release ; Simple Display Line:   300 mg, 1 tab(s), po,  q 24 hrs, 95 tab(s), 3 Refill(s) ; Ordering Provider:   Can Bowers MD; Catalog Code:   buPROPion ; Order Dt/Tm:   9/5/2018 12:28:59 PM          lamoTRIgine  :   lamoTRIgine ; Status:   Prescribed ; Ordered As Mnemonic:   lamoTRIgine 200 mg oral tablet ; Simple Display Line:   200 mg, 1 tab(s), po, daily, 95 tab(s), 3 Refill(s) ; Ordering Provider:   Can Bowers MD; Catalog Code:   lamoTRIgine ; Order Dt/Tm:   9/5/2018 12:29:17 PM

## 2022-02-16 NOTE — TELEPHONE ENCOUNTER
---------------------  From: Aracelis Rodriguez CMA (eRx Pool ([id number]_[facility]))   To: SUSAN Message Pool ([id number]_[place] [facility] [place]);     Sent: 8/17/2021 5:51:12 AM CDT  Subject: FW: Medication Management   Due Date/Time: 8/18/2021 12:15:00 AM CDT           ------------------------------------------  From: [facility]  To: Can Bowers MD  Sent: August 17, 2021 12:15:46 AM CDT  Subject: Medication Management  Due: August 17, 2021 2:47:22 PM CDT     ** On Hold Pending Signature **     Drug: buPROPion (buPROPion 300 mg/24 hours (XL) oral tablet, extended release), TAKE 1 TABLET EVERY 24 HOURS  Quantity: 95 tab(s)  Days Supply: 0  Refills: 3  Substitutions Allowed  Notes from Pharmacy:     Dispensed Drug: buPROPion (buPROPion 300 mg/24 hours (XL) oral tablet, extended release), TAKE 1 TABLET EVERY 24 HOURS  Quantity: 95 tab(s)  Days Supply: 0  Refills: 3  Substitutions Allowed  Notes from Pharmacy:     ** On Hold Pending Signature **     Drug: lamoTRIgine (lamoTRIgine 200 mg oral tablet), TAKE 1 TABLET DAILY  Quantity: 95 tab(s)  Days Supply: 0  Refills: 3  Substitutions Allowed  Notes from Pharmacy:     Dispensed Drug: lamoTRIgine (lamoTRIgine 200 mg oral tablet), TAKE 1 TABLET DAILY  Quantity: 95 tab(s)  Days Supply: 0  Refills: 3  Substitutions Allowed  Notes from Pharmacy:  ------------------------------------------  ** Not Approved: Refill not appropriate, refilled on 2/25/21 for #95 refill 3 **  buPROPion (BUPROPION HCL XL TABS 300MG)  TAKE 1 TABLET EVERY 24 HOURS  Qty:  95 tab(s)        Days Supply:  0        Refills:  3          Substitutions Allowed     Route To Pharmacy - [facility]   Signed by Bette Sims CMA        Medications were refilled on 2/25/21 when pt was in for a med check with [facility] and medications were refilled #95 refill 3---------------------  From: Bette Sims CMA   To: [facility]    Sent: 8/18/2021 1:09:07 PM CDT  Subject: FW:  Medication Management     ** Not Approved: Refill not appropriate, refilled on 2/25/21 for #95 refill 3 **  lamoTRIgine (LAMOTRIGINE TABS 200MG)  TAKE 1 TABLET DAILY  Qty:  95 tab(s)        Days Supply:  0        Refills:  3          Substitutions Allowed     Route To Pharmacy - [facility]   Signed by Bette Sims CMA

## 2022-02-16 NOTE — PROGRESS NOTES
Patient:   FLORY MEANS            MRN: [id number]            FIN: [id number]               Age:   33 years     Sex:  Male     :  1985   Associated Diagnoses:   Chemical Dependency; Bipolar Disorder   Author:   Can Bowers MD      Visit Information   Opiate addiction with oxycontin and percocet. Started Suboxone 2008 at 24mg daily. Tried to taper in 2009 but returned to 24mg daily. Became fatigued with first taper. Started taper from 16mg in 2011.    Admitted marijuana use 2013  Random urine: normal 2013; 2013; May 2014; 2014      Date of Service: 2019 04:58 pm  Performing Location: [facility]  Encounter#: [id number]      Primary Care Provider (PCP):  Can Bowers MD    NPI# [id number]      Referring Provider:  Can Bowers MD    NPI# [id number]      Chief Complaint   4/3/2019 5:04 PM CDT     Medication management- Suboxone        History of Present Illness   One film had been lasting every 10 days and now every 12 days. Working later now that not working for himself. Hours at work are 8-5pm. Took suboxone today.  Restarted 2mg 2014.   Feels better on wellbutrin and lamictal as opposed to the prozac and lithium. Not having the ups and downs throughout the day as much.  Smoking 1/2 ppd.  Mind racing controlled with lamictal. Temper decreased with wellbutrin.  Back to exercising.  Started  working for [employer]. 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    Consulted with Dr Lama (May 11th, 2015) and recommends inpatient treatment to start vivitrol.     Mom (Magdalena Means) present for the visit 2015    Needed percocet and vicodin for postoperative pain related to Calcaneus fracture. Happened 2017. Now back to full activities         Review of Systems   Constitutional:  sleeping good.    Gastrointestinal:  No abdominal pain.    Neurologic:  No headache.     Psychiatric:  mom has noticed he gets distracted and gets distracted at work; did fine in school growing up; has used a light in past for seasonal affective disorder.      PHQ-9: 5pts (October 2018). 5pts (October 2017). 3pts (April 2017). 5pts (March 2016). 3pts (January 2015); 11pts (May 2014); 5pts (May 2013). 8pts April 2012; 7 pts February 2012. 5pts November 2011. 6 pts (July 2011). 13pts (May 2011) . 5pts March 2010  MDQ:  0pts and no problem (October 2018). 2pts and no problem (October 2017). 1pt and no problem (March 2016). 0pts and minor problem (March 2015). 1 pt and no problem(January 2015). 1pt and minor problem (May 2014).  2pts (May 2013). 2pts April 2012; 1pt February 2012. 2pts May 2011. 2pts March 2010  DAVON-7: 1pt (October 2018). 4pts (October 2017)      Health Status   Allergies:    Allergic Reactions (Selected)  No known allergies   Medications:  (Selected)   Prescriptions  Prescribed  Suboxone 2 mg-0.5 mg sublingual film: 0.125-0.25 EA, sl, daily, # 4 EA, 3 Refill(s), Type: Maintenance, [facility], [facility], 0.125-0.25 EA SL daily  buPROPion 300 mg/24 hours (XL) oral tablet, extended release: = 1 tab(s) ( 300 mg ), po, q 24 hrs, # 95 tab(s), 3 Refill(s), Type: Maintenance, Pharmacy: [facility], 1 tab(s) Oral q 24 hrs  lamoTRIgine 200 mg oral tablet: = 1 tab(s) ( 200 mg ), po, daily, # 95 tab(s), 3 Refill(s), Type: Maintenance, Pharmacy: [facility], 1 tab(s) Oral daily   Problem list:    All Problems  Chemical dependency / SNOMED CT 542751805 / Confirmed  Bipolar disorder / SNOMED CT 7342134601 / Confirmed  Obese / SNOMED CT 2563202005 / Probable  Recurrent depressive disorder / SNOMED CT 281364675 / Confirmed  Resolved: Right middle finger tendon repair      Histories   Procedure history:    Right middle finger tendon repair in the month of 12/2005 at 20 Years.     Family History: Parents alive and healthy. Sister healthy (two  "children)    Social History: Works for [employer]) April 2018. 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  and wife has history of meth abuse in high school but clean since 2001. Son born 2012. Have been trying to have a child for a few years      Physical Examination   Vital Signs   4/3/2019 5:04 PM CDT Temperature Tympanic 98.2 DegF    Peripheral Pulse Rate 78 bpm    Pulse Site Radial artery    HR Method Manual    Systolic Blood Pressure 120 mmHg    Diastolic Blood Pressure 68 mmHg    Mean Arterial Pressure 85 mmHg    BP Site Right arm    BP Method Manual      Measurements from flowsheet : Measurements   4/3/2019 5:04 PM CDT Height Measured - Standard 70 in    Weight Measured - Standard 194.2 lb    BSA 2.08 m2    Body Mass Index 27.86 kg/m2  HI      General:  Alert and oriented, No acute distress.    Musculoskeletal:  Normal gait.    Psychiatric:  Appropriate mood & affect.       Review / Management   Normal LFT October 2018  Reviewed [place] Drug Px Monitoring Program (April 2019)  Urine drug screen: negative for buprenorphines since on such low dose      Impression and Plan   Diagnosis     Chemical Dependency (KAH14-VV F19.20).     Bipolar Disorder (FZL67-CL F31.9).       Chemical Dependency: decrease to 3 tabs today for 4 months. Has decreased from 15 tabs a month (March 2016) and 5 tabs a month (July 2017). Will check urine test 1-2x a year. Check LFT's in November  Bipolar Disorder: continue lamictal and wellbutrin as going well. Decreased Wellbutrin to 300mg daily July 2017 and doing well. Stopped prozac September 2014 (had been on it since 2011) and lithium.    Continues to lose weight from high of 250 lbs. Gained weight with nonweight bearing status for 2 months.  "

## 2022-02-16 NOTE — PROGRESS NOTES
Patient:   FLORY MEANS            MRN: [id number]            FIN: [id number]               Age:   35 years     Sex:  Male     :  1985   Associated Diagnoses:   Chemical Dependency; Bipolar Disorder   Author:   Can Bowers MD      Visit Information   Opiate addiction with oxycontin and percocet. Started Suboxone 2008 at 24mg daily. Tried to taper in 2009 but returned to 24mg daily. Became fatigued with first taper. Started taper from 16mg in 2011.    Admitted marijuana use 2013  Random urine: normal 2013; 2013; May 2014; 2014      Date of Service: 2020 03:48 pm  Performing Location: [facility]  Encounter#: [id number]      Primary Care Provider (PCP):  Can Bowers MD    NPI# [id number]      Referring Provider:  Can Bowers MD    NPI# [id number]      Chief Complaint   2020 3:51 PM CDT    Suboxone check        History of Present Illness   Cuts each film into 16 pieces and then cuts each piece in half taking 1/2 in morning and 1/2 in evening.  Restarted 2mg 2014.   Mind racing controlled with lamictal. Temper decreased with wellbutrin. Feels better on wellbutrin and lamictal as opposed to the prozac and lithium. Not having the ups and downs throughout the day as much.    Started  working for [employer]. 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  Smoking 1/2 ppd.    Past information:  Mom (Magdalena Means) present for the visit 2015  Needed percocet and vicodin for postoperative pain related to Calcaneus fracture. Happened 2017. Now back to full activities  Consulted with Dr Lama (May 11th, 2015) and recommends inpatient treatment to start vivitrol.       Review of Systems   Constitutional:  sleeping good.    Gastrointestinal:  No abdominal pain.    Neurologic:  No headache.    Psychiatric:  mom has noticed he gets distracted and gets distracted at work; did fine  "in school growing up; has used a light in past for seasonal affective disorder.      PHQ-9: 5pts (October 2018). 5pts (October 2017). 3pts (April 2017). 5pts (March 2016). 3pts (January 2015); 11pts (May 2014); 5pts (May 2013). 8pts April 2012; 7 pts February 2012. 5pts November 2011. 6 pts (July 2011). 13pts (May 2011) . 5pts March 2010  MDQ:  0pts and no problem (October 2018). 2pts and no problem (October 2017). 1pt and no problem (March 2016). 0pts and minor problem (March 2015). 1 pt and no problem(January 2015). 1pt and minor problem (May 2014).  2pts (May 2013). 2pts April 2012; 1pt February 2012. 2pts May 2011. 2pts March 2010  DAVON-7: 1pt (October 2018). 4pts (October 2017)      Health Status   Allergies:    Allergic Reactions (Selected)  No known allergies   Medications:  (Selected)   Prescriptions  Prescribed  Suboxone 2 mg-0.5 mg sublingual film: 0.075-0.25 EA, sl, daily, # 3 EA, 5 Refill(s), Type: Maintenance, Pharmacy: [facility], [facility], 0.075-0.25 EA SL daily  buPROPion 300 mg/24 hours (XL) oral tablet, extended release: = 1 tab(s) ( 300 mg ), po, q 24 hrs, # 95 tab(s), 3 Refill(s), Type: Maintenance, Pharmacy: [facility], 1 tab(s) Oral q 24 hrs  lamoTRIgine 200 mg oral tablet: = 1 tab(s) ( 200 mg ), po, daily, # 95 tab(s), 3 Refill(s), Type: Maintenance, Pharmacy: [facility], 1 tab(s) Oral daily   Problem list:    All Problems  Chemical dependency / SNOMED CT 247582600 / Confirmed  Bipolar disorder / SNOMED CT 0471186656 / Confirmed  Obese / SNOMED CT 8474642010 / Probable  Recurrent depressive disorder / SNOMED CT 039009526 / Confirmed  Resolved: Right middle finger tendon repair      Histories     Family History: Parents alive and healthy. Sister healthy (two children)    Social History: Works for [employer]) April 2018. 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  and wife has " history of meth abuse in high school but clean since 2001.  October 2019 from wife and share custody of son. Son born 2012. Involved in a new relationship (Cee)      Physical Examination   Vital Signs   7/23/2020 3:51 PM CDT Temperature Tympanic 98.6 DegF    Peripheral Pulse Rate 76 bpm    Pulse Site Radial artery    HR Method Manual    Systolic Blood Pressure 118 mmHg    Diastolic Blood Pressure 66 mmHg    Mean Arterial Pressure 83 mmHg    BP Site Right arm    BP Method Manual      Measurements from flowsheet : Measurements   7/23/2020 3:51 PM CDT Height Measured - Standard 70 in    Weight Measured - Standard 193.2 lb    BSA 2.08 m2    Body Mass Index 27.72 kg/m2  HI      General:  Alert and oriented, No acute distress.    Musculoskeletal:  Normal gait.    Psychiatric:  Appropriate mood & affect.       Review / Management   Normal LFT December 2019  Reviewed [place] Drug Px Monitoring Program (December 2019)  Urine drug screen: negative for buprenorphines since on such low dose      Impression and Plan   Diagnosis     Chemical Dependency (DIC62-XU F19.20).     Bipolar Disorder (VII51-MD F31.9).       Chemical Dependency: continue 3 tabs monthly and continue to try to wean. 6 refills given. Has decreased from 15 tabs a month (March 2016) and 5 tabs a month (July 2017) and then 3 tabs monthly (April 2019).   Bipolar Disorder: continue lamictal and wellbutrin as going well. Decreased Wellbutrin to 300mg daily July 2017 and doing well. Stopped prozac September 2014 (had been on it since 2011) and lithium.    Weight: doing well. Weighed 250 lbs in 2012

## 2022-02-16 NOTE — TELEPHONE ENCOUNTER
---------------------  From: Aracelis Rodriguez CMA (eRx Pool ([id number]_[facility]))   To: SUSAN Message Pool ([id number]_[place] [facility] [place]);     Sent: 12/26/2019 8:07:17 PM CST  Subject: FW: Medication Management   Due Date/Time: 12/27/2019 4:22:00 PM CST           ------------------------------------------  From: [facility]  To: Can Bowers MD  Sent: December 26, 2019 4:22:27 PM CST  Subject: Medication Management  Due: December 27, 2019 4:22:27 PM CST    ** On Hold Pending Signature **  Drug: buprenorphine-naloxone (Suboxone 2 mg-0.5 mg sublingual film)  PLACE 0.075-0.25 FILM UNDER THE TONGUE DAILY  Quantity: 3 unknown unit  Days Supply: 0  Refills: 0  Substitutions Allowed  Notes from Pharmacy:     Dispensed Drug: buprenorphine-naloxone (buprenorphine-naloxone 2 mg-0.5 mg sublingual film)  PLACE 0.075-0.25 FILM UNDER THE TONGUE DAILY  Quantity: 3 unknown unit  Days Supply: 0  Refills: 0  Substitutions Allowed  Notes from Pharmacy:   ---------------------------------------------------------------  From: Bette Sims CMA   To: [facility]    Sent: 12/27/2019 5:02:29 PM CST  Subject: FW: Medication Management     ** Not Approved: Refill not appropriate, refilled on 12/26/19 for # 3 refill 5 **  buprenorphine-naloxone (BUPREN/NALOX MIS 2-0.5MG APPLY)  PLACE 0.075-0.25 FILM UNDER THE TONGUE DAILY  Qty:  3 unknown unit        Days Supply:  0        Refills:  0          Substitutions Allowed     Route To Pharmacy - [facility]   Signed by Bette Sims CMA

## 2022-02-16 NOTE — LETTER
(Inserted Image. Unable to display)   December 27, 2019      FLORY MEANS      [address], [place] [id number]        Dear FLORY,    Thank you for selecting [facility] for your healthcare needs.  Below you will find the results of your recent tests done at our clinic.     Liver tests are normal      Result Name Current Result Previous Result Reference Range   Bilirubin Total (mg/dL)  0.4 12/26/2019  0.4 10/31/2018 0.2 - 1.2   AST/SGOT (unit/L)  22 12/26/2019  21 10/31/2018 10 - 40   ALT/SGPT (unit/L)  29 12/26/2019  23 10/31/2018 9 - 46       Please contact me or my assistant at [phone] if you have any questions about your results.     Sincerely,        Can Bowers MD        What do your labs mean?  Below is a glossary of commonly ordered labs:  LDL   Bad Cholesterol   HDL   Good Cholesterol  AST/ALT   Liver Function   Cr/Creatinine   Kidney Function  Microalbumin   Kidney Function  BUN   Kidney Function  PSA   Prostate    TSH   Thyroid Hormone  HgbA1c   Diabetes Test   Hgb (Hemoglobin)   Red Blood Cells

## 2022-02-16 NOTE — PROGRESS NOTES
Patient:   FLORY MEANS            MRN: [id number]            FIN: [id number]               Age:   32 years     Sex:  Male     :  1985   Associated Diagnoses:   Chemical Dependency; Bipolar Disorder   Author:   Can Bowers MD      Visit Information   Opiate addiction with oxycontin and percocet. Started Suboxone 2008 at 24mg daily. Tried to taper in 2009 but returned to 24mg daily. Became fatigued with first taper. Started taper from 16mg in 2011.    Admitted marijuana use 2013  Random urine: normal 2013; 2013; May 2014; 2014      Date of Service: 10/18/2017 11:47 am  Performing Location: [facility]  Encounter#: [id number]      Primary Care Provider (PCP):  Can Bowers MD    NPI# [id number]      Referring Provider:  Can Bowers MD    NPI# [id number]      Chief Complaint   10/18/2017 11:50 AM CDT  Suboxone refill        History of Present Illness   Cuts each tab into 6 pieces and takes 1 piece a day (1/2 in morning and 1/2 at night).Taking less than 0.5 mg daily. Has adjusted to the dose. Working later now that not working for himself. Hours at work are 8-5pm. Job going well. Took suboxone today.  Restarted 2mg 2014.   Feels better on wellbutrin and lamictal as opposed to the prozac and lithium. Not having the ups and downs throughout the day as much.  Tried patches and cut down to 1/2 ppd but now back to 1ppd.  Mind racing controlled with lamictal. Temper decreased with wellbutrin.  Exercises every morning and feels that helps him mentally. Continues to lose weight.  Started  working for [employer]. Now working at [employer]    Consulted with Dr Lama (May 11th, 2015) and recommends inpatient treatment to start vivitrol.     Mom (Magdalena Means) present for the visit 2015         Review of Systems   Constitutional:  sleeping good.    Gastrointestinal:  No abdominal pain.    Neurologic:  No  headache.    Psychiatric:  mom has noticed he gets distracted and gets distracted at work; did fine in school growing up; has used a light in past for seasonal affective disorder.      PHQ-9: 5pts (October 2017). 3pts (April 2017). 5pts (March 2016). 3pts (January 2015); 11pts (May 2014); 5pts (May 2013). 8pts April 2012; 7 pts February 2012. 5pts November 2011. 6 pts (July 2011). 13pts (May 2011) . 5pts March 2010  MDQ:  2pts and no problem (October 2017). 1pt and no problem (March 2016). 0pts and minor problem (March 2015). 1 pt and no problem(January 2015). 1pt and minor problem (May 2014).  2pts (May 2013). 2pts April 2012; 1pt February 2012. 2pts May 2011. 2pts March 2010  DAVON-7: 4pts (October 2017)      Health Status   Allergies:    Allergic Reactions (Selected)  No known allergies   Medications:  (Selected)   Prescriptions  Prescribed  Suboxone 2 mg-0.5 mg sublingual film: 0.125-0.25 EA, sl, daily, # 4 EA, 2 Refill(s), Type: Maintenance, Pharmacy: [facility], Called to [facility], 0.125-0.25 EA sl daily  buPROPion 300 mg/24 hours (XL) oral tablet, extended release: 1 tab(s) ( 300 mg ), po, q 24 hrs, # 95 tab(s), 3 Refill(s), Type: Maintenance, Pharmacy: [facility], 1 tab(s) po q 24 hrs  lamoTRIgine 200 mg oral tablet: 1 tab(s) ( 200 mg ), po, daily, # 95 tab(s), 3 Refill(s), Type: Maintenance, Pharmacy: [facility], 1 tab(s) po daily   Problem list:    All Problems  Chemical dependency / SNOMED CT 858306390 / Confirmed  Bipolar disorder / SNOMED CT 5548685015 / Confirmed  Obese / SNOMED CT 6614438376 / Probable  Recurrent depressive disorder / SNOMED CT 297957141 / Confirmed  Resolved: Right middle finger tendon repair      Histories   Procedure history:    Right middle finger tendon repair in the month of 12/2005 at 20 Years.   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    Musculoskeletal:  Normal gait.    Psychiatric:  Appropriate mood & affect.       Review / Management   Normal LFT July 2017  Reviewed [place] Drug Px Monitoring Program (October 2017)  Urine drug screen: positive for buprenorphines (July 2017)      Impression and Plan   Diagnosis     Chemical Dependency (NRZ67-QB F19.20).     Bipolar Disorder (QOK91-QY F31.9).       Chemical Dependency: continue 4 tabs today (switched jobs recently) for 3 months. Consider reduction to 3 tabs in 2018. Follow up end of January. Has decreased from 15 tabs a month (March 2016). No urine drug test needed today  Bipolar Disorder: continue lamictal and wellbutrin as going well. Decrease Wellbutrin to 300mg daily. Stopped prozac September 2014 (had been on it since 2011) and lithium.    Continues to lose weight from high of 250 lbs

## 2022-02-16 NOTE — PROGRESS NOTES
Patient:   FLORY MEANS            MRN: [id number]            FIN: [id number]               Age:   35 years     Sex:  Male     :  1985   Associated Diagnoses:   Chemical Dependency; Bipolar Disorder   Author:   Can Bowers MD      Visit Information   Opiate addiction with oxycontin and percocet. Started Suboxone 2008 at 24mg daily. Tried to taper in 2009 but returned to 24mg daily. Became fatigued with first taper. Started taper from 16mg in 2011.    Admitted marijuana use 2013  Random urine: normal 2013; 2013; May 2014; 2014      Date of Service: 2021 03:33 pm  Performing Location: [facility]  Encounter#: [id number]      Primary Care Provider (PCP):  Can Bowers MD    NPI# [id number]      Referring Provider:  Can Bowers MD    NPI# [id number]      Chief Complaint   2021 3:39 PM CST    Suboxone check        History of Present Illness   Cuts each film into 16 pieces and then cuts each piece in half taking 1/2 in morning and 1/2 in evening. Has decreased from 15 tabs a month (2016) and 5 tabs a month (2017) and then 3 tabs monthly (2019).   Restarted 2mg 2014.   Mind racing controlled with lamictal. Temper decreased with wellbutrin. Feels better on wellbutrin and lamictal as opposed to the prozac and lithium. Not having the ups and downs throughout the day as much.    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    Past information:  Mom (Magdalena Means) present for the visit 2015  Needed percocet and vicodin for postoperative pain related to Calcaneus fracture. Happened 2017. Now back to full activities  Consulted with Dr Lama (May 11th, 2015) and recommends inpatient treatment to start vivitrol.       Review of Systems   Constitutional:  sleeping good.    Gastrointestinal:  No abdominal pain.     Neurologic:  No headache.    Psychiatric:  mom has noticed he gets distracted and gets distracted at work; did fine in school growing up; has used a light in past for seasonal affective disorder.      PHQ-9: 5pts (October 2018). 5pts (October 2017). 3pts (April 2017). 5pts (March 2016). 3pts (January 2015); 11pts (May 2014); 5pts (May 2013). 8pts April 2012; 7 pts February 2012. 5pts November 2011. 6 pts (July 2011). 13pts (May 2011) . 5pts March 2010  MDQ:  0pts and no problem (October 2018). 2pts and no problem (October 2017). 1pt and no problem (March 2016). 0pts and minor problem (March 2015). 1 pt and no problem(January 2015). 1pt and minor problem (May 2014).  2pts (May 2013). 2pts April 2012; 1pt February 2012. 2pts May 2011. 2pts March 2010  DAVON-7: 1pt (October 2018). 4pts (October 2017)      Health Status   Allergies:    Allergic Reactions (Selected)  No known allergies   Medications:  (Selected)   Prescriptions  Prescribed  Suboxone 2 mg-0.5 mg sublingual film: 0.075-0.25 EA, sl, daily, # 3 EA, 11 Refill(s), Type: Maintenance, [facility], [facility], 0.075-0.25 EA SL daily, 70, in, 02/25/21 15:39:00 CST, Height Measured, 197.4, lb, 02/25/21 15:39:00 CST, Weight Measured  buPROPion 300 mg/24 hours (XL) oral tablet, extended release: = 1 tab(s) ( 300 mg ), po, q 24 hrs, # 95 tab(s), 3 Refill(s), Type: Maintenance, Pharmacy: [facility], 1 tab(s) Oral q 24 hrs, 70, in, 02/25/21 15:39:00 CST, Height Measured, 197.4, lb, 02/25/21 15:39:00 CST, Weight Measured  lamoTRIgine 200 mg oral tablet: = 1 tab(s) ( 200 mg ), po, daily, # 95 tab(s), 3 Refill(s), Type: Maintenance, Pharmacy: [facility], 1 tab(s) Oral daily, 70, in, 02/25/21 15:39:00 CST, Height Measured, 197.4, lb, 02/25/21 15:39:00 CST, Weight Measured   Problem list:    All Problems  Chemical dependency / SNOMED CT 202082470 / Confirmed  Bipolar disorder / SNOMED CT 7373902867 / Confirmed  Obese / SNOMED CT 1647814131 /  "Probable  Recurrent depressive disorder / SNOMED CT 837828614 / Confirmed  Tobacco user / SNOMED CT 311066769 / Probable  Resolved: Right middle finger tendon repair      Histories   Procedure history:    Right middle finger tendon repair in the month of 12/2005 at 20 Years.     Family History: Parents alive and healthy. Sister healthy (two children)    Social History: Works for [employer]) April 2018. 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  and wife has history of meth abuse in high school but clean since 2001.  October 2019 from wife and share custody of son. Son born 2012. Involved in a new relationship (Cee) 2019      Physical Examination   Vital Signs   2/25/2021 3:39 PM CST Temperature Tympanic 98 DegF    Peripheral Pulse Rate 80 bpm    Pulse Site Radial artery    HR Method Manual    Systolic Blood Pressure 118 mmHg    Diastolic Blood Pressure 76 mmHg    Mean Arterial Pressure 90 mmHg    BP Site Right arm    BP Method Manual      Measurements from flowsheet : Measurements   2/25/2021 3:39 PM CST Height Measured - Standard 70 in    Weight Measured - Standard 197.4 lb    BSA 2.1 m2    Body Mass Index 28.32 kg/m2  HI      General:  Alert and oriented, No acute distress.    Musculoskeletal:  Normal gait.    Psychiatric:  Appropriate mood & affect.       Review / Management   Normal LFT December 2019  Reviewed [place] Drug Px Monitoring Program (February 2021)  Urine drug screen November 2018: negative for buprenorphines since on such low dose (no need to repeat at this time)      Impression and Plan   Diagnosis     Chemical Dependency (ABO11-TX F19.20).     Bipolar Disorder (KAR18-JZ F31.9).       Chemical Dependency: continue 3 tabs monthly with follow up in one year.    Bipolar Disorder: continue lamictal and wellbutrin as going well. Decreased Wellbutrin to 300mg daily July 2017 and doing well. Stopped prozac September 2014 (had been " on it since 2011) and lithium.    Weight: doing well. Weighed 250 lbs in 2012

## 2022-02-16 NOTE — LETTER
(Inserted Image. Unable to display)   February 26, 2021      FLORY MEANS      [address], [place] [id number]        Dear FLORY,    Thank you for selecting [facility] for your healthcare needs.  Below you will find the results of your recent tests done at our clinic.     Liver tests are normal      Result Name Current Result Previous Result Reference Range   Bilirubin Total (mg/dL)  0.3 2/25/2021  0.4 12/26/2019 0.2 - 1.2   AST/SGOT (unit/L)  23 2/25/2021  22 12/26/2019 10 - 40   ALT/SGPT (unit/L)  24 2/25/2021  29 12/26/2019 9 - 46       Please contact me or my assistant at [phone] if you have any questions about your results.     Sincerely,        Can Bowers MD        What do your labs mean?  Below is a glossary of commonly ordered labs:  LDL   Bad Cholesterol   HDL   Good Cholesterol  AST/ALT   Liver Function   Cr/Creatinine   Kidney Function  Microalbumin   Kidney Function  BUN   Kidney Function  PSA   Prostate    TSH   Thyroid Hormone  HgbA1c   Diabetes Test   Hgb (Hemoglobin)   Red Blood Cells

## 2022-02-16 NOTE — PROGRESS NOTES
Patient:   FLORY MEANS            MRN: [id number]            FIN: [id number]               Age:   31 years     Sex:  Male     :  1985   Associated Diagnoses:   Chemical Dependency; Bipolar Disorder   Author:   Can Bowers MD      Visit Information   Opiate addiction with oxycontin and percocet. Started Suboxone 2008 at 24mg daily. Tried to taper in 2009 but returned to 24mg daily. Became fatigued with first taper. Started taper from 16mg in 2011.    Admitted marijuana use 2013  Random urine: normal 2013; 2013; May 2014; 2014      Date of Service: 2017 08:13 am  Performing Location: [facility]  Encounter#: [id number]      Primary Care Provider (PCP):  Can Bowers MD    NPI# [id number]      Referring Provider:  No referring provider recorded for selected visit.      Chief Complaint   2017 8:43 AM CST     suboxone check      History of Present Illness   Cuts each tab into 5 pieces and takes 1 piece a day (1/2 in morning and 1/2 at night).Taking less than 1 mg daily. Has adjusted to the dose and now feels comfortable decreasing this month. Working later now that not working for himself. Hours at work are 8-5pm. Job going well. Received a raise in January.  Restarted 2mg 2014.   Feels better on wellbutrin and lamictal as opposed to the prozac and lithium. Not having the ups and downs throughout the day as much.  Tried patches and cut down to 1/2 ppd but now back to 1ppd.  Mind racing controlled with lamictal. Temper decreased with wellbutrin.  Exercises every morning and feels that helps him mentally. Continues to lose weight.  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     Mom (Magdalena Means) present for the visit 2015         Review of Systems   Constitutional:  sleeping good.     Gastrointestinal:  No abdominal pain.    Neurologic:  No headache.    Psychiatric:  mom has noticed he gets distracted and gets distracted at work; did fine in school growing up; has used a light in past for seasonal affective disorder.      PHQ-9: 5pts (March 2016). 3pts (January 2015); 11pts (May 2014); 5pts (May 2013). 8pts April 2012; 7 pts February 2012. 5pts November 2011. 6 pts (July 2011). 13pts (May 2011) . 5pts March 2010  MDQ:  1pt and no problem (March 2016). 0pts and minor problem (March 2015). 1 pt and no problem(January 2015). 1pt and minor problem (May 2014).  2pts (May 2013). 2pts April 2012; 1pt February 2012. 2pts May 2011. 2pts March 2010      Health Status   Allergies:    Allergic Reactions (Selected)  No known allergies   Medications:  (Selected)   Prescriptions  Prescribed  Suboxone 2 mg-0.5 mg sublingual film: 0.125-0.25 EA, sl, daily, # 6 EA, 1 Refill(s), Type: Maintenance, [facility], 0.125-0.25 EA sl daily  buPROPion 150 mg/24 hours (XL) oral tablet, extended release: 3 tab(s) ( 450 mg ), po, q 24 hrs, # 280 tab(s), 3 Refill(s), Type: Maintenance, Pharmacy: [facility], 3 tab(s) po q 24 hrs  lamoTRIgine 200 mg oral tablet: 1 tab(s) ( 200 mg ), po, daily, Instructions: [facility], # 95 tab(s), 3 Refill(s), Type: Maintenance, Pharmacy: [facility], 1 tab(s) po daily,[facility]   Problem list:    All Problems  Bipolar Disorder / ICD-9-.80 / Confirmed  Chemical Dependency / ICD-9-.90 / Confirmed  Obese / ICD-9-.00 / Probable  Recurrent depressive disorder / ICD-9-.30 / Confirmed  Resolved: Right middle finger tendon repair      Histories   Procedure history:    Right middle finger tendon repair in the month of 12/2005 at 20 Years.   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    Musculoskeletal:  Normal gait.    Psychiatric:  Appropriate mood & affect.       Review / Management   Normal LFT May 2016   Urine positive for buprenorphines         Impression and Plan   Diagnosis     Chemical Dependency (YNS52-VZ F19.20).     Bipolar Disorder (OSD78-ZM F31.9).       Chemical Dependency: decrease to 5 tabs today (switched jobs recently) for 3 months. Follow up end of April (goal to reduce to 4 tabs a month in May). Encouraged to reduce to  half the amount on the weekends this month and then daily after performance review. Has decreased from 15 tabs a month a year ago (February 2017)  Bipolar Disorder: continue lamictal and wellbutrin as going well. Stopped prozacSeptember 2014 (had been on it since 2011) and lithium.    Psychiatric consult for possible bipolar disorder Dr Carbera in May 2015.

## 2022-02-16 NOTE — TELEPHONE ENCOUNTER
Entered by Charisma Jacobs on August 19, 2020 9:01:27 AM CDT  PCP:   SUSAN    Medication:   Bupropion- Last filled 7/23/20 #3 R 6           Lamotrigine- Last filled 7/23/20 #95 R3       Date of last office visit and reason:   7/23/20 Chemical Dependancy f/u   Date of last labs pertaining to condition:  _    Note:  Please advise on refills     Return to Clinic order placed?  none     Resource:   _  Phone:   _            ** Patient matched by Charisma Jacobs on 8/19/2020 8:58:04 AM CDT **      ------------------------------------------  From: [facility]  To: Can Bowers MD  Sent: August 19, 2020 12:49:22 AM CDT  Subject: Medication Management  Due: August 12, 2020 4:14:54 PM CDT     ** On Hold Pending Signature **     Dispensed Drug: lamoTRIgine (lamoTRIgine 200 mg oral tablet), TAKE 1 TABLET DAILY  Quantity: 95 tab(s)  Days Supply: 0  Refills: 3  Substitutions Allowed  Notes from Pharmacy:     ** On Hold Pending Signature **     Dispensed Drug: buPROPion (buPROPion 300 mg/24 hours (XL) oral tablet, extended release), TAKE 1 TABLET EVERY 24 HOURS  Quantity: 95 tab(s)  Days Supply: 0  Refills: 3  Substitutions Allowed  Notes from Pharmacy:  ---------------------------------------------------------------  From: Charisma Jacobs (eRx Pool ([id number]_[facility]))   To: RANDI Message Pool ([id number]_[place] [facility] [place]);     Sent: 8/19/2020 9:01:32 AM CDT  Subject: FW: Medication Management   Due Date/Time: 8/20/2020 12:49:00 AM CDTNot sure if pt is requesting medications be filled through [facility], as I confirmed with him at apt   he wanted [facility].---------------------  From: Bette Sims CMA ([employer] Message Pool ([id number]_[facility]))   To: Can Bowers MD;     Sent: 8/19/2020 9:59:04 AM CDT  Subject: FW: Medication Management   Due Date/Time: 8/20/2020 12:49:00 AM CDT---------------------  From: Can Bowers MD   To: Bette Sims CMA;     Sent: 8/19/2020 10:08:17 AM  CDT  Subject: RE: Medication Management     Call and ask him. [facility] so [facility] medications to be [facility] he will cancel medication from [facility] if they call

## 2022-02-16 NOTE — PROGRESS NOTES
Patient:   FLORY MEANS            MRN: [id number]            FIN: [id number]               Age:   34 years     Sex:  Male     :  1985   Associated Diagnoses:   Chemical Dependency; Bipolar Disorder   Author:   Can Bowers MD      Visit Information   Opiate addiction with oxycontin and percocet. Started Suboxone 2008 at 24mg daily. Tried to taper in 2009 but returned to 24mg daily. Became fatigued with first taper. Started taper from 16mg in 2011.    Admitted marijuana use 2013  Random urine: normal 2013; 2013; May 2014; 2014      Date of Service: 08/15/2019 04:12 pm  Performing Location: [facility]  Encounter#: [id number]      Primary Care Provider (PCP):  Can Bowers MD    NPI# [id number]      Referring Provider:  Can Bowers MD    NPI# [id number]      Chief Complaint   8/15/2019 4:22 PM CDT    Suboxone check      History of Present Illness   One film had been lasting every 10 days and now every 12 days. Working later now that not working for himself. Hours at work are 8-5pm. Took suboxone today.  Restarted 2mg 2014.   Feels better on wellbutrin and lamictal as opposed to the prozac and lithium. Not having the ups and downs throughout the day as much.  Smoking 1/2 ppd.  Mind racing controlled with lamictal. Temper decreased with wellbutrin.  Back to exercising.  Started  working for [employer]. 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    Consulted with Dr Lama (May 11th, 2015) and recommends inpatient treatment to start vivitrol.     Mom (Magdalena Means) present for the visit 2015    Needed percocet and vicodin for postoperative pain related to Calcaneus fracture. Happened 2017. Now back to full activities         Review of Systems   Constitutional:  sleeping good.    Gastrointestinal:  No abdominal pain.    Neurologic:  No headache.    Psychiatric:  mom has noticed  he gets distracted and gets distracted at work; did fine in school growing up; has used a light in past for seasonal affective disorder.      PHQ-9: 5pts (October 2018). 5pts (October 2017). 3pts (April 2017). 5pts (March 2016). 3pts (January 2015); 11pts (May 2014); 5pts (May 2013). 8pts April 2012; 7 pts February 2012. 5pts November 2011. 6 pts (July 2011). 13pts (May 2011) . 5pts March 2010  MDQ:  0pts and no problem (October 2018). 2pts and no problem (October 2017). 1pt and no problem (March 2016). 0pts and minor problem (March 2015). 1 pt and no problem(January 2015). 1pt and minor problem (May 2014).  2pts (May 2013). 2pts April 2012; 1pt February 2012. 2pts May 2011. 2pts March 2010  DAVON-7: 1pt (October 2018). 4pts (October 2017)      Health Status   Allergies:    Allergic Reactions (Selected)  No known allergies   Medications:  (Selected)   Prescriptions  Prescribed  Suboxone 2 mg-0.5 mg sublingual film: 0.075-0.25 EA, sl, daily, # 3 EA, 3 Refill(s), Type: Maintenance, [facility]acy: [facility], [facility], 0.075-0.25 EA SL daily  buPROPion 300 mg/24 hours (XL) oral tablet, extended release: = 1 tab(s) ( 300 mg ), po, q 24 hrs, # 95 tab(s), 3 Refill(s), Type: Maintenance, Pharmacy: [facility], 1 tab(s) Oral q 24 hrs  lamoTRIgine 200 mg oral tablet: = 1 tab(s) ( 200 mg ), po, daily, # 95 tab(s), 3 Refill(s), Type: Maintenance, Pharmacy: [facility], 1 tab(s) Oral daily   Problem list:    All Problems  Chemical dependency / SNOMED CT 496483715 / Confirmed  Bipolar disorder / SNOMED CT 5484325164 / Confirmed  Obese / SNOMED CT 5797842689 / Probable  Recurrent depressive disorder / SNOMED CT 462411890 / Confirmed  Resolved: Right middle finger tendon repair      Histories   Procedure history:    Right middle finger tendon repair in the month of 12/2005 at 20 Years.     Family History: Parents alive and healthy. Sister healthy (two children)    Social History: Works for  [employer] April 2018. 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  and wife has history of meth abuse in high school but clean since 2001. Son born 2012. Have been trying to have a child for a few years      Physical Examination   Vital Signs   8/15/2019 4:22 PM CDT Temperature Tympanic 98.1 DegF    Peripheral Pulse Rate 76 bpm    Pulse Site Radial artery    HR Method Manual    Systolic Blood Pressure 118 mmHg    Diastolic Blood Pressure 70 mmHg    Mean Arterial Pressure 86 mmHg    BP Site Right arm    BP Method Manual      Measurements from flowsheet : Measurements   8/15/2019 4:22 PM CDT Height Measured - Standard 70 in    Weight Measured - Standard 178.6 lb    BSA 2 m2    Body Mass Index 25.62 kg/m2  HI      General:  Alert and oriented, No acute distress.    Musculoskeletal:  Normal gait.    Psychiatric:  Appropriate mood & affect.       Review / Management   Normal LFT October 2018  Reviewed [place] Drug Px Monitoring Program (August 2019)  Urine drug screen: negative for buprenorphines since on such low dose      Impression and Plan   Diagnosis     Chemical Dependency (OCX47-KN F19.20).     Bipolar Disorder (QQJ83-EV F31.9).       Chemical Dependency: continue 3 tabs monthly for 4 months. Has decreased from 15 tabs a month (March 2016) and 5 tabs a month (July 2017). Will check urine test 1-2x a year. Check LFT's in November.   Bipolar Disorder: continue lamictal and wellbutrin as going well. Decreased Wellbutrin to 300mg daily July 2017 and doing well. Stopped prozac September 2014 (had been on it since 2011) and lithium.    Continues to lose weight from high of 250 lbs. Gained weight with nonweight bearing status for 2 months.  "

## 2022-02-16 NOTE — PROGRESS NOTES
Patient:   FLORY MEANS            MRN: [id number]            FIN: [id number]               Age:   32 years     Sex:  Male     :  1985   Associated Diagnoses:   Chemical Dependency; Bipolar Disorder   Author:   Can Bowers MD      Visit Information   Opiate addiction with oxycontin and percocet. Started Suboxone 2008 at 24mg daily. Tried to taper in 2009 but returned to 24mg daily. Became fatigued with first taper. Started taper from 16mg in 2011.    Admitted marijuana use 2013  Random urine: normal 2013; 2013; May 2014; 2014      Date of Service: 2017 05:20 pm  Performing Location: [facility]  Encounter#: [id number]      Primary Care Provider (PCP):  Can Bowers MD    NPI# [id number]      Referring Provider:  No referring provider recorded for selected visit.      Chief Complaint   2017 5:29 PM CDT    Suboxone check      History of Present Illness   Cuts each tab into 6 pieces and takes 1 piece a day (1/2 in morning and 1/2 at night).Taking less than 0.5 mg daily. Has adjusted to the dose.Working later now that not working for himself. Hours at work are 8-5pm. Job going well. Took suboxone today.  Restarted 2mg 2014.   Feels better on wellbutrin and lamictal as opposed to the prozac and lithium. Not having the ups and downs throughout the day as much.  Tried patches and cut down to 1/2 ppd but now back to 1ppd.  Mind racing controlled with lamictal. Temper decreased with wellbutrin.  Exercises every morning and feels that helps him mentally. Continues to lose weight.  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     Mom (Magdalena Means) present for the visit 2015         Review of Systems   Constitutional:  sleeping good.    Gastrointestinal:  No abdominal pain.    Neurologic:  No  headache.    Psychiatric:  mom has noticed he gets distracted and gets distracted at work; did fine in school growing up; has used a light in past for seasonal affective disorder.      PHQ-9: 3pts (April 2017). 5pts (March 2016). 3pts (January 2015); 11pts (May 2014); 5pts (May 2013). 8pts April 2012; 7 pts February 2012. 5pts November 2011. 6 pts (July 2011). 13pts (May 2011) . 5pts March 2010  MDQ:  1pt and no problem (March 2016). 0pts and minor problem (March 2015). 1 pt and no problem(January 2015). 1pt and minor problem (May 2014).  2pts (May 2013). 2pts April 2012; 1pt February 2012. 2pts May 2011. 2pts March 2010               Health Status   Allergies:    Allergic Reactions (Selected)  No known allergies   Medications:  (Selected)   Prescriptions  Prescribed  Suboxone 2 mg-0.5 mg sublingual film: 0.125-0.25 EA, sl, daily, # 5 EA, 2 Refill(s), Type: Maintenance, Pharmacy[facility], [facility], 0.125-0.25 EA sl daily  buPROPion 150 mg/24 hours (XL) oral tablet, extended release: 3 tab(s) ( 450 mg ), po, q 24 hrs, # 280 tab(s), 3 Refill(s), Type: Maintenance, Pharmacy: [facility], 3 tab(s) po q 24 hrs  lamoTRIgine 200 mg oral tablet: 1 tab(s) ( 200 mg ), po, daily, Inst[facility]: [facility], # 95 tab(s), 3 Refill(s), Type: Maintenance, Pharmacy: [facility], 1 tab(s) po daily,[facility]   Problem list:    All Problems  Bipolar Disorder / ICD-9-.80 / Confirmed  Chemical Dependency / ICD-9-.90 / Confirmed  Obese / ICD-9-.00 / Probable  Recurrent depressive disorder / ICD-9-.30 / Confirmed  Resolved: Right middle finger tendon repair      Histories   Procedure history:    Right middle finger tendon repair in the month of 12/2005 at 20 Years.   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    Musculoskeletal:  Normal gait.    Psychiatric:  Appropriate mood & affect.       Review / Management   Normal LFT May 2016      Impression and Plan   Diagnosis     Chemical Dependency (ISE28-FI F19.20).     Bipolar Disorder (FOS06-WN F31.9).       Chemical Dependency: decrease to 5 tabs today (switched jobs recently) for 3 months. Follow up end of July (goal to reduce to 4 tabs a month in July). Encouraged to reduce to  half the amount on the weekends this month and then daily after performance review. Has decreased from 15 tabs a month a year ago (March 2016)  Bipolar Disorder: continue lamictal and wellbutrin as going well. Stopped prozacSeptember 2014 (had been on it since 2011) and lithium.

## 2022-02-16 NOTE — NURSING NOTE
Comprehensive Intake Entered On:  12/26/2019 3:58 PM CST    Performed On:  12/26/2019 3:54 PM CST by Bette Sims CMA               Summary   Chief Complaint :   Suboxone check   Weight Measured :   185.6 lb(Converted to: 185 lb 10 oz, 84.19 kg)    Height Measured :   70 in(Converted to: 5 ft 10 in, 177.80 cm)    Body Mass Index :   26.63 kg/m2 (HI)    Body Surface Area :   2.04 m2   Systolic Blood Pressure :   120 mmHg   Diastolic Blood Pressure :   60 mmHg   Mean Arterial Pressure :   80 mmHg   Peripheral Pulse Rate :   78 bpm   BP Site :   Right arm   Pulse Site :   Radial artery   BP Method :   Manual   HR Method :   Manual   Temperature Tympanic :   98.4 DegF(Converted to: 36.9 DegC)    Bette Sims CMA - 12/26/2019 3:54 PM CST   Health Status   Allergies Verified? :   Yes   Medication History Verified? :   Yes   Medical History Verified? :   No   Pre-Visit Planning Status :   Completed   Tobacco Use? :   Current every day smoker   Bette Sims CMA - 12/26/2019 3:54 PM CST   Consents   Consent for Immunization Exchange :   Consent Granted   Consent for Immunizations to Providers :   Consent Granted   Bette Sims CMA - 12/26/2019 3:54 PM CST   Meds / Allergies   (As Of: 12/26/2019 3:58:09 PM CST)   Allergies (Active)   No known allergies  Estimated Onset Date:   Unspecified ; Created By:   Aurora Garcia; Reaction Status:   Active ; Category:   Drug ; Substance:   No known allergies ; Type:   Allergy ; Updated By:   Aurora Garcia; Reviewed Date:   12/26/2019 3:56 PM CST        Medication List   (As Of: 12/26/2019 3:58:09 PM CST)   Prescription/Discharge Order    buprenorphine-naloxone  :   buprenorphine-naloxone ; Status:   Prescribed ; Ordered As Mnemonic:   Suboxone 2 mg-0.5 mg sublingual film ; Simple Display Line:   0.075-0.25 EA, sl, daily, 3 EA, 3 Refill(s) ; Ordering Provider:   Can Bowers MD; Catalog Code:   buprenorphine-naloxone ; Order Dt/Tm:   8/15/2019 4:33:20 PM CDT           buPROPion  :   buPROPion ; Status:   Prescribed ; Ordered As Mnemonic:   buPROPion 300 mg/24 hours (XL) oral tablet, extended release ; Simple Display Line:   300 mg, 1 tab(s), po, q 24 hrs, 95 tab(s), 3 Refill(s) ; Ordering Provider:   Can Bowers MD; Catalog Code:   buPROPion ; Order Dt/Tm:   8/15/2019 4:37:08 PM CDT          lamoTRIgine  :   lamoTRIgine ; Status:   Prescribed ; Ordered As Mnemonic:   lamoTRIgine 200 mg oral tablet ; Simple Display Line:   200 mg, 1 tab(s), po, daily, 95 tab(s), 3 Refill(s) ; Ordering Provider:   Can Bowers MD; Catalog Code:   lamoTRIgine ; Order Dt/Tm:   8/15/2019 4:37:01 PM CDT

## 2022-02-16 NOTE — PROGRESS NOTES
Patient:   FLORY MEANS            MRN: [id number]            FIN: [id number]               Age:   34 years     Sex:  Male     :  1985   Associated Diagnoses:   Chemical Dependency; Bipolar Disorder   Author:   Can Bowers MD      Visit Information   Opiate addiction with oxycontin and percocet. Started Suboxone 2008 at 24mg daily. Tried to taper in 2009 but returned to 24mg daily. Became fatigued with first taper. Started taper from 16mg in 2011.    Admitted marijuana use 2013  Random urine: normal 2013; 2013; May 2014; 2014      Date of Service: 2019 03:33 pm  Performing Location: [facility]  Encounter#: [id number]      Primary Care Provider (PCP):  Can Bowers MD    NPI# [id number]      Referring Provider:  Can Bowers MD    NPI# [id number]      Chief Complaint   2019 3:54 PM CST   Suboxone check      History of Present Illness   Cuts each film into 12 pieces and then cuts each piece in half taking 1/2 in morning and 1/2 in evening.  Restarted 2mg 2014.   Mind racing controlled with lamictal. Temper decreased with wellbutrin. Feels better on wellbutrin and lamictal as opposed to the prozac and lithium. Not having the ups and downs throughout the day as much.    Started  working for [employer]. 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  Smoking 1/2 ppd.    Past information:  Mom (Magdalena Means) present for the visit 2015  Needed percocet and vicodin for postoperative pain related to Calcaneus fracture. Happened 2017. Now back to full activities  Consulted with Dr Lama (May 11th, 2015) and recommends inpatient treatment to start vivitrol.       Review of Systems   Constitutional:  sleeping good.    Gastrointestinal:  No abdominal pain.    Neurologic:  No headache.    Psychiatric:  mom has noticed he gets distracted and gets distracted at work; did fine in  "school growing up; has used a light in past for seasonal affective disorder.      PHQ-9: 5pts (October 2018). 5pts (October 2017). 3pts (April 2017). 5pts (March 2016). 3pts (January 2015); 11pts (May 2014); 5pts (May 2013). 8pts April 2012; 7 pts February 2012. 5pts November 2011. 6 pts (July 2011). 13pts (May 2011) . 5pts March 2010  MDQ:  0pts and no problem (October 2018). 2pts and no problem (October 2017). 1pt and no problem (March 2016). 0pts and minor problem (March 2015). 1 pt and no problem(January 2015). 1pt and minor problem (May 2014).  2pts (May 2013). 2pts April 2012; 1pt February 2012. 2pts May 2011. 2pts March 2010  DAVON-7: 1pt (October 2018). 4pts (October 2017)      Health Status   Allergies:    Allergic Reactions (Selected)  No known allergies   Medications:  (Selected)   Prescriptions  Prescribed  Suboxone 2 mg-0.5 mg sublingual film: 0.075-0.25 EA, sl, daily, # 3 EA, 3 Refill(s), Type: Maintenance, Pharmacy: [facility], [facility], 0.075-0.25 EA SL daily  buPROPion 300 mg/24 hours (XL) oral tablet, extended release: = 1 tab(s) ( 300 mg ), po, q 24 hrs, # 95 tab(s), 3 Refill(s), Type: Maintenance, Pharmacy: [facility], 1 tab(s) Oral q 24 hrs  lamoTRIgine 200 mg oral tablet: = 1 tab(s) ( 200 mg ), po, daily, # 95 tab(s), 3 Refill(s), Type: Maintenance, Pharmacy: [facility], 1 tab(s) Oral daily   Problem list:    All Problems  Chemical dependency / SNOMED CT 370418845 / Confirmed  Bipolar disorder / SNOMED CT 1775336458 / Confirmed  Obese / SNOMED CT 7571888220 / Probable  Recurrent depressive disorder / SNOMED CT 289341757 / Confirmed  Resolved: Right middle finger tendon repair      Histories     Family History: Parents alive and healthy. Sister healthy (two children)    Social History: Works for [employer]) April 2018. 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  and wife has history " of meth abuse in high school but clean since 2001.  October 2019 from wife and share custody of son. Son born 2012.      Physical Examination   Vital Signs   12/26/2019 3:54 PM CST Temperature Tympanic 98.4 DegF    Peripheral Pulse Rate 78 bpm    Pulse Site Radial artery    HR Method Manual    Systolic Blood Pressure 120 mmHg    Diastolic Blood Pressure 60 mmHg    Mean Arterial Pressure 80 mmHg    BP Site Right arm    BP Method Manual      Measurements from flowsheet : Measurements   12/26/2019 3:54 PM CST Height Measured - Standard 70 in    Weight Measured - Standard 185.6 lb    BSA 2.04 m2    Body Mass Index 26.63 kg/m2  HI      General:  Alert and oriented, No acute distress.    Musculoskeletal:  Normal gait.    Psychiatric:  Appropriate mood & affect.       Review / Management   Normal LFT December 2019  Reviewed [place] Drug Px Monitoring Program (December 2019)  Urine drug screen: negative for buprenorphines since on such low dose         Impression and Plan   Diagnosis     Chemical Dependency (UXD97-AC F19.20).     Bipolar Disorder (AYH73-XC F31.9).       Chemical Dependency: continue 3 tabs monthly and continue to try to wean. 5 refills given. Has decreased from 15 tabs a month (March 2016) and 5 tabs a month (July 2017).   Bipolar Disorder: continue lamictal and wellbutrin as going well. Decreased Wellbutrin to 300mg daily July 2017 and doing well. Stopped prozac September 2014 (had been on it since 2011) and lithium.    Weight: doing well. Weighed 250 lbs in 2012

## 2022-02-16 NOTE — TELEPHONE ENCOUNTER
---------------------  From: Aylin Peralta CMA   To: [facility] Message Pool ([id number]);     Sent: 8/20/2021 3:36:26 PM CDT  Subject: refills     Patient wants refill of suboxone sent to [facility], last refill 2/25/21, told to follow up in 1 year.  Patient also wants to switch wellbutrin and lamictal to [facility] pharmacy. Patient only needs return call if unable to fill.---------------------  From: Emilia/Charisma PEREZ ([employer] Message Pool ([id number]_[facility]))   To: [facility] Message Pool ([id number]);     Sent: 8/20/2021 3:48:36 PM CDT  Subject: [facility].     Last refill of Lamotrigine 200mg daily was 2/25/21  Quantity 95 RF 2 [facility]    Bupropion 300mg daily  lst refill was 2/25/21  Quantity 95 RF 2   [facility]---------------------  From: Bette Sims CMA ([facility] Message Pool ([id number]))   To: Can Bowers MD;     Sent: 8/22/2021 8:06:20 AM CDT  Subject: FW: refills---------------------  From: Can Bowers MD   To: Bette Sims CMA;     Sent: 8/22/2021 8:19:25 AM CDT  Subject: RE: refills     Done

## 2022-02-16 NOTE — PROGRESS NOTES
Patient:   FLORY MEANS            MRN: [id number]            FIN: [id number]               Age:   33 years     Sex:  Male     :  1985   Associated Diagnoses:   Chemical Dependency; Bipolar Disorder   Author:   Can Bowers MD      Visit Information   Opiate addiction with oxycontin and percocet. Started Suboxone 2008 at 24mg daily. Tried to taper in 2009 but returned to 24mg daily. Became fatigued with first taper. Started taper from 16mg in 2011.    Admitted marijuana use 2013  Random urine: normal 2013; 2013; May 2014; 2014      Date of Service: 2018 06:23 pm  Performing Location: [facility]  Encounter#: [id number]      Primary Care Provider (PCP):  Can Bowers MD    NPI# [id number]      Referring Provider:  Can Bowers MD    NPI# [id number]      Chief Complaint   2018 6:33 PM CDT    suboxone check        History of Present Illness   Cuts each tab into 14 pieces and takes two pieces a day (1 in morning and 1 at night).Taking less than 0.5 mg daily. Has adjusted to the dose. Working later now that not working for himself. Hours at work are 8-5pm. Currently off work. Took suboxone today.  Restarted 2mg 2014.   Feels better on wellbutrin and lamictal as opposed to the prozac and lithium. Not having the ups and downs throughout the day as much.  Smoking 3/4ppd.  Mind racing controlled with lamictal. Temper decreased with wellbutrin.  Back to exercising.  Started  working for dealership. Now working at the [employer]. 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    Consulted with Dr Lama (May 11th, 2015) and recommends inpatient treatment to start vivitrol.     Mom (Magdalena Means) present for the visit 2015    Needed percocet and vicodin for postoperative pain related to Calcaneus fracture. Happened 2017. Now back to full activities         Review of Systems    Constitutional:  sleeping good.    Gastrointestinal:  No abdominal pain.    Neurologic:  No headache.    Psychiatric:  mom has noticed he gets distracted and gets distracted at work; did fine in school growing up; has used a light in past for seasonal affective disorder.      PHQ-9: 5pts (October 2017). 3pts (April 2017). 5pts (March 2016). 3pts (January 2015); 11pts (May 2014); 5pts (May 2013). 8pts April 2012; 7 pts February 2012. 5pts November 2011. 6 pts (July 2011). 13pts (May 2011) . 5pts March 2010  MDQ:  2pts and no problem (October 2017). 1pt and no problem (March 2016). 0pts and minor problem (March 2015). 1 pt and no problem(January 2015). 1pt and minor problem (May 2014).  2pts (May 2013). 2pts April 2012; 1pt February 2012. 2pts May 2011. 2pts March 2010  DAVON-7: 4pts (October 2017)      Health Status   Allergies:    Allergic Reactions (Selected)  No known allergies   Medications:  (Selected)   Prescriptions  Prescribed  Suboxone 2 mg-0.5 mg sublingual film: 0.125-0.25 EA, sl, daily, # 4 EA, 2 Refill(s), Type: Maintenance, Pharmacy: [facility], Called to [facility], 0.125-0.25 EA sl daily  buPROPion 300 mg/24 hours (XL) oral tablet, extended release: 1 tab(s) ( 300 mg ), po, q 24 hrs, # 95 tab(s), 1 Refill(s), Type: Maintenance, Pharmacy: [facility], 1 tab(s) po q 24 hrs  lamoTRIgine 200 mg oral tablet: 1 tab(s) ( 200 mg ), po, daily, # 95 tab(s), 3 Refill(s), Type: Maintenance, Pharmacy: [facility], 1 tab(s) po daily   Problem list:    All Problems  Chemical dependency / SNOMED CT 584116876 / Confirmed  Bipolar disorder / SNOMED CT 8925512647 / Confirmed  Obese / SNOMED CT 0665958328 / Probable  Recurrent depressive disorder / SNOMED CT 187380801 / Confirmed  Resolved: Right middle finger tendon repair      Histories   Procedure history:    Right middle finger tendon repair in the month of 12/2005 at 20 Years.   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,  and wife has history of meth abuse in high school but clean since 2001      Physical Examination   Vital Signs   7/25/2018 6:33 PM CDT Temperature Tympanic 98.1 DegF    Peripheral Pulse Rate 78 bpm    Pulse Site Radial artery    HR Method Manual    Systolic Blood Pressure 122 mmHg    Diastolic Blood Pressure 70 mmHg    Mean Arterial Pressure 87 mmHg    BP Site Right arm    BP Method Manual      Measurements from flowsheet : Measurements   7/25/2018 6:33 PM CDT Height Measured - Standard 70 in    Weight Measured - Standard 190.8 lb    BSA 2.07 m2    Body Mass Index 27.37 kg/m2  HI      General:  Alert and oriented, No acute distress.    Musculoskeletal:  Normal gait.    Psychiatric:  Appropriate mood & affect.       Review / Management   Normal LFT July 2017  Review[place] Drug [place] Monitoring [place] (April 2018)  Urine drug screen: positive for buprenorphines (February 2018)      Impression and Plan   Diagnosis     Chemical Dependency (QQK80-VC F19.20).     Bipolar Disorder (SXE95-AD F31.9).       Chemical Dependency: continue 4 tabs today for 3 months. Will try to spread one each over 9 days instead of currently 7 days. Consider reduction to 3 tabs in 2018. Has decreased from 15 tabs a month (March 2016) and 5 tabs a month (July 2017). Will check urine test every other time. Check LFT's in November  Bipolar Disorder: continue lamictal and wellbutrin as going well. Decrease Wellbutrin to 300mg daily. Stopped prozac September 2014 (had been on it since 2011) and lithium.    Continues to lose weight from high of 250 lbs. Gained weight with nonweight bearing status for 2 months.  "

## 2022-03-02 NOTE — TELEPHONE ENCOUNTER
------------------------------------------  From: [employer]  To: Can Bowers MD  Sent: February 17, 2022 3:24:42 PM CST  Subject: Medication Management  Due: February 16, 2022 12:00:45 AM CST     ** On Hold Pending Signature **     Drug: buprenorphine-naloxone (buprenorphine-naloxone 2 mg-0.5 mg sublingual film), 0.075-0.25 EACH UNDER THE TONGUE DAILY  Quantity: 3 film  Days Supply: 12  Refills: 5  Substitutions Allowed  Notes from Pharmacy:     Dispensed Drug: buprenorphine-naloxone (buprenorphine-naloxone 2 mg-0.5 mg sublingual film), 0.075-0.25 EACH UNDER THE TONGUE DAILY  Quantity: 3 film  Days Supply: 12  Refills: 5  Substitutions Allowed  Notes from Pharmacy:  ---------------------------------------------------------------  From: Nayla Russell MA ([facility] ([id number]   To: Can Bowers MD;     Sent: 2/22/2022 10:12:03 AM CST  Subject: FW: Medication Management   Due Date/Time: 2/22/2022 3:24:00 PM CST     LV:  02/25/2021 for chemical dependency f/u.  Please advise re: refills.---------------------  From: Can Bowers MD   To: Bette Sims CMA; Phone [employer] Pool ([id number]);     Sent: 2/22/2022 10:16:56 AM CST  Subject: RE: Medication Management     Let him know one month sent to [facility]. Needs to be seen for more refillsPatient notified

## 2022-04-21 ENCOUNTER — OFFICE VISIT (OUTPATIENT)
Dept: FAMILY MEDICINE | Facility: CLINIC | Age: 37
End: 2022-04-21
Payer: COMMERCIAL

## 2022-04-21 VITALS
DIASTOLIC BLOOD PRESSURE: 66 MMHG | OXYGEN SATURATION: 97 % | SYSTOLIC BLOOD PRESSURE: 120 MMHG | HEART RATE: 72 BPM | BODY MASS INDEX: 27.14 KG/M2 | TEMPERATURE: 98 F | WEIGHT: 189.6 LBS | HEIGHT: 70 IN

## 2022-04-21 DIAGNOSIS — R06.81 APNEA: ICD-10-CM

## 2022-04-21 DIAGNOSIS — F19.20 CHEMICAL DEPENDENCY (H): ICD-10-CM

## 2022-04-21 DIAGNOSIS — F39 MOOD DISORDER (H): Primary | ICD-10-CM

## 2022-04-21 PROCEDURE — 99213 OFFICE O/P EST LOW 20 MIN: CPT | Performed by: EMERGENCY MEDICINE

## 2022-04-21 RX ORDER — BUPRENORPHINE AND NALOXONE 2; .5 MG/1; MG/1
FILM, SOLUBLE BUCCAL; SUBLINGUAL
Qty: 3 FILM | Refills: 5 | Status: SHIPPED | OUTPATIENT
Start: 2022-04-21 | End: 2022-10-18

## 2022-04-21 RX ORDER — BUPROPION HYDROCHLORIDE 300 MG/1
300 TABLET ORAL DAILY
Qty: 95 TABLET | Refills: 3 | Status: SHIPPED | OUTPATIENT
Start: 2022-04-21 | End: 2022-08-30

## 2022-04-21 RX ORDER — LAMOTRIGINE 200 MG/1
200 TABLET ORAL DAILY
Qty: 95 TABLET | Refills: 3 | Status: SHIPPED | OUTPATIENT
Start: 2022-04-21 | End: 2022-08-30

## 2022-04-21 RX ORDER — BUPRENORPHINE AND NALOXONE 2; .5 MG/1; MG/1
FILM, SOLUBLE BUCCAL; SUBLINGUAL
COMMUNITY
Start: 2021-08-22 | End: 2022-04-21

## 2022-04-21 NOTE — PROGRESS NOTES
History of Present Illness:  Chung Cordero is a 37 year old male    Opiate addiction with oxycontin and percocet began in about 2003. Had percocet when cut tendons in a finger and noticed liked it. Started Suboxone September 2008 at 24mg daily. Tried to taper in November 2009 but returned to 24mg daily. Became fatigued with first taper. Started taper from 16mg in October 2011.     Admitted marijuana use Sept 7, 2013  Random urine: normal March 2013; Septemeber 2013; May 2014; December 2014     History of Present Illness   Cuts each film into 16 pieces and then cuts each piece in half taking 1/2 in morning and 1/2 in evening. Has decreased from 15 tabs a month (March 2016) and 5 tabs a month (July 2017) and then 3 tabs monthly (April 2019).   Restarted 2mg March 2014.   Mind racing controlled with lamictal. Temper decreased with wellbutrin. Feels better on wellbutrin and lamictal as opposed to the prozac and lithium. Not having the ups and downs throughout the day as much.     Started 2016 working for [employer]. 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  Smoking 1/4 ppd.     Past information:  Mom (Magdalena Cordero) present for the visit December 2015  Needed percocet and vicodin for postoperative pain related to Calcaneus fracture. Happened October 2017.      Review of Systems   Girlfriend has noticed apnea at night  Constitutional:  sleeping good.    Gastrointestinal:  No abdominal pain.    Neurologic:  No headache.    Psychiatric:  mom has noticed in past he gets distracted and gets distracted at work. Did fine in school growing up. Has used a light in past for seasonal affective disorder.       PHQ-9: 5pts (October 2018). 5pts (October 2017). 3pts (April 2017). 5pts (March 2016). 3pts (January 2015); 11pts (May 2014); 5pts (May 2013). 8pts April 2012; 7 pts February 2012. 5pts November 2011. 6 pts (July 2011). 13pts (May 2011) . 5pts March 2010  MDQ:  0pts and no problem (October  "2018). 2pts and no problem (October 2017). 1pt and no problem (March 2016). 0pts and minor problem (March 2015). 1 pt and no problem(January 2015). 1pt and minor problem (May 2014).  2pts (May 2013). 2pts April 2012; 1pt February 2012. 2pts May 2011. 2pts March 2010  DAVON-7: 1pt (October 2018). 4pts (October 2017)     Current Outpatient Medications   Medication Sig Dispense Refill     buprenorphine HCl-naloxone HCl (SUBOXONE) 2-0.5 MG per film 0.075-0.25 EA, sl, daily, # 3 EA, 0 Refill(s), Type: Maintenance, Pharmacy: [facility], Called [facility], 0.075-0.25 EA SL daily, 70, in, 02/25/21 15:39:00 CST, Height Measured, 197.4, lb, 02/25/21 15:39:00 CST, Weight Measured 3 Film 5     buPROPion (WELLBUTRIN XL) 300 MG 24 hr tablet Take 1 tablet (300 mg) by mouth daily 95 tablet 3     lamoTRIgine (LAMICTAL) 200 MG tablet Take 1 tablet (200 mg) by mouth daily 95 tablet 3       No past medical history on file.  Past Surgical History:   Procedure Laterality Date     REPAIR TENDON FINGER(S)       [facility] OPEN TREATMENT CALCANEAL FRACTURE Left 11/7/2017    Procedure: OPEN REDUCTION INTERNAL FIXATION CALCANEAL FRACTURE LEFT FOOT;  Surgeon: Carlos Pittman DPM;  Location: [facility];  Service: Podiatry     Family History: Parents alive and healthy. Sister healthy (two children)     Social History: Works for [employer] April 2018. 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  and wife has history of meth abuse in high school but clean since 2001.  October 2019 from wife and share custody of son. Son born 2012. Involved in a new relationship (Cee) 2019     /66 (BP Location: Right arm)   Pulse 72   Temp 98  F (36.7  C) (Tympanic)   Ht 1.778 m (5' 10\")   Wt 86 kg (189 lb 9.6 oz)   SpO2 97%   BMI 27.20 kg/m    General: no acute distress  Musculoskeletal: normal gait       Review / Management   Normal LFT December 2019  Reviewed [place] Drug [place] Monitoring [place] (April 2022)  Urine " drug screen November 2018: negative for buprenorphines since on such low dose (no need to repeat at this time)     Assessment and Plan:    Chemical Dependency: continue 3 tabs monthly with follow up in one year.    Bipolar Disorder: continue lamictal and wellbutrin as going well. Decreased Wellbutrin to 300mg daily July 2017 and doing well. Stopped prozac September 2014 (had been on it since 2011) and lithium.  Concern for sleep apnea: home sleep test.     Weight: doing well. Weighed 250 lbs in 2012

## 2022-07-08 ENCOUNTER — TELEPHONE (OUTPATIENT)
Dept: FAMILY MEDICINE | Facility: CLINIC | Age: 37
End: 2022-07-08

## 2022-07-08 DIAGNOSIS — G47.33 OSA (OBSTRUCTIVE SLEEP APNEA): Primary | ICD-10-CM

## 2022-07-08 NOTE — TELEPHONE ENCOUNTER
Reason for Call: Request for an order or referral:    Order or referral being requested: HST    Date needed: as soon as possible    Has the patient been seen by the PCP for this problem? YES    Additional comments: Please place orders for HST    Phone number Patient can be reached at:  Cell number on file:    Telephone Information:   Mobile [phone]       Best Time:  anytime    Can we leave a detailed message on this number?  YES    Call taken on 7/8/2022 at 12:45 PM by Yanna Alvarez

## 2022-07-21 ENCOUNTER — TELEPHONE (OUTPATIENT)
Dept: FAMILY MEDICINE | Facility: CLINIC | Age: 37
End: 2022-07-21

## 2022-07-21 ENCOUNTER — OFFICE VISIT (OUTPATIENT)
Dept: SLEEP MEDICINE | Facility: CLINIC | Age: 37
End: 2022-07-21
Attending: INTERNAL MEDICINE
Payer: COMMERCIAL

## 2022-07-21 DIAGNOSIS — G47.33 OSA (OBSTRUCTIVE SLEEP APNEA): Primary | ICD-10-CM

## 2022-07-21 DIAGNOSIS — G47.33 OSA (OBSTRUCTIVE SLEEP APNEA): ICD-10-CM

## 2022-07-21 PROCEDURE — G0399 HOME SLEEP TEST/TYPE 3 PORTA: HCPCS | Performed by: INTERNAL MEDICINE

## 2022-07-21 NOTE — PROGRESS NOTES
Patient was shown how to use Nox T3 equipment and watched set up video. Patient verbalized understanding.

## 2022-08-12 NOTE — PROGRESS NOTES
This HSAT was performed using a Noxturnal T3 device which recorded snore, sound, movement activity, body position, nasal pressure, oronasal thermal airflow, pulse, oximetry and both chest and abdominal respiratory effort. HSAT data was restricted to the time patient states they were in bed.     HSAT was scored using 1B 4% hypopnea rule.     HST AHI (Non-PAT): 3.5  Snoring was reported as mild.  Time with SpO2 below 89% was 0 minutes.   Overall signal quality was good.     Pt will follow up with sleep provider to determine appropriate therapy.

## 2022-08-30 ENCOUNTER — OFFICE VISIT (OUTPATIENT)
Dept: SLEEP MEDICINE | Facility: CLINIC | Age: 37
End: 2022-08-30
Payer: COMMERCIAL

## 2022-08-30 ENCOUNTER — TELEPHONE (OUTPATIENT)
Dept: FAMILY MEDICINE | Facility: CLINIC | Age: 37
End: 2022-08-30

## 2022-08-30 VITALS
BODY MASS INDEX: 27.68 KG/M2 | OXYGEN SATURATION: 98 % | HEART RATE: 75 BPM | TEMPERATURE: 98 F | SYSTOLIC BLOOD PRESSURE: 128 MMHG | DIASTOLIC BLOOD PRESSURE: 80 MMHG | WEIGHT: 192.9 LBS

## 2022-08-30 DIAGNOSIS — F39 MOOD DISORDER (H): ICD-10-CM

## 2022-08-30 DIAGNOSIS — R06.83 SNORING: Primary | ICD-10-CM

## 2022-08-30 DIAGNOSIS — F33.9 RECURRENT DEPRESSIVE DISORDER (H): ICD-10-CM

## 2022-08-30 DIAGNOSIS — F31.76 BIPOLAR DISORDER, IN FULL REMISSION, MOST RECENT EPISODE DEPRESSED (H): ICD-10-CM

## 2022-08-30 PROBLEM — F31.9 BIPOLAR DISORDER (H): Status: ACTIVE | Noted: 2022-08-30

## 2022-08-30 PROCEDURE — 99202 OFFICE O/P NEW SF 15 MIN: CPT | Performed by: INTERNAL MEDICINE

## 2022-08-30 RX ORDER — LAMOTRIGINE 200 MG/1
200 TABLET ORAL DAILY
Qty: 95 TABLET | Refills: 1 | Status: SHIPPED | OUTPATIENT
Start: 2022-08-30 | End: 2023-03-14

## 2022-08-30 RX ORDER — BUPROPION HYDROCHLORIDE 300 MG/1
300 TABLET ORAL DAILY
Qty: 95 TABLET | Refills: 1 | Status: SHIPPED | OUTPATIENT
Start: 2022-08-30 | End: 2022-08-30

## 2022-08-30 RX ORDER — LAMOTRIGINE 200 MG/1
200 TABLET ORAL DAILY
Qty: 95 TABLET | Refills: 1 | Status: SHIPPED | OUTPATIENT
Start: 2022-08-30 | End: 2022-08-30

## 2022-08-30 RX ORDER — BUPROPION HYDROCHLORIDE 300 MG/1
300 TABLET ORAL DAILY
Qty: 95 TABLET | Refills: 1 | Status: SHIPPED | OUTPATIENT
Start: 2022-08-30 | End: 2023-03-14

## 2022-08-30 ASSESSMENT — SLEEP AND FATIGUE QUESTIONNAIRES
HOW LIKELY ARE YOU TO NOD OFF OR FALL ASLEEP WHEN YOU ARE A PASSENGER IN A CAR FOR AN HOUR WITHOUT A BREAK: SLIGHT CHANCE OF DOZING
HOW LIKELY ARE YOU TO NOD OFF OR FALL ASLEEP IN A CAR, WHILE STOPPED FOR A FEW MINUTES IN TRAFFIC: SLIGHT CHANCE OF DOZING
HOW LIKELY ARE YOU TO NOD OFF OR FALL ASLEEP WHILE SITTING AND READING: MODERATE CHANCE OF DOZING
HOW LIKELY ARE YOU TO NOD OFF OR FALL ASLEEP WHILE SITTING QUIETLY AFTER LUNCH WITHOUT ALCOHOL: SLIGHT CHANCE OF DOZING
HOW LIKELY ARE YOU TO NOD OFF OR FALL ASLEEP WHILE WATCHING TV: MODERATE CHANCE OF DOZING
HOW LIKELY ARE YOU TO NOD OFF OR FALL ASLEEP WHILE SITTING INACTIVE IN A PUBLIC PLACE: MODERATE CHANCE OF DOZING
HOW LIKELY ARE YOU TO NOD OFF OR FALL ASLEEP WHILE LYING DOWN TO REST IN THE AFTERNOON WHEN CIRCUMSTANCES PERMIT: MODERATE CHANCE OF DOZING
HOW LIKELY ARE YOU TO NOD OFF OR FALL ASLEEP WHILE SITTING AND TALKING TO SOMEONE: SLIGHT CHANCE OF DOZING

## 2022-08-30 NOTE — TELEPHONE ENCOUNTER
Prescription approved per [facility] Refill Protocol.  Pt notified prescriptions have been transferred; he expressed appreciation.  Prescriptions transferred as requested.   Last Written Prescription Date: 8/30/22  Last Fill Quantity: 95,  # refills: 1   Last office visit: 4/21/2022 with prescribing provider   Future Office Visit:   Next 5 appointments (look out 90 days)    Oct 18, 2022  4:20 PM  (Arrive by 4:00 PM)  Provider Visit with Can Mayorga MD  [facility] ) [address] [place] [phone]

## 2022-08-30 NOTE — PATIENT INSTRUCTIONS
Your Body mass index is 27.68 kg/m .  Weight management is a personal decision.  If you are interested in exploring weight loss strategies, the following discussion covers the approaches that may be successful. Body mass index (BMI) is one way to tell whether you are at a healthy weight, overweight, or obese. It measures your weight in relation to your height.  A BMI of 18.5 to 24.9 is in the healthy range. A person with a BMI of 25 to 29.9 is considered overweight, and someone with a BMI of 30 or greater is considered obese. More than two-thirds of American adults are considered overweight or obese.  Being overweight or obese increases the risk for further weight gain. Excess weight may lead to heart disease and diabetes.  Creating and following plans for healthy eating and physical activity may help you improve your health.  Weight control is part of healthy lifestyle and includes exercise, emotional health, and healthy eating habits. Careful eating habits lifelong are the mainstay of weight control. Though there are significant health benefits from weight loss, long-term weight loss with diet alone may be very difficult to achieve- studies show long-term success with dietary management in less than 10% of people. Attaining a healthy weight may be especially difficult to achieve in those with severe obesity. In some cases, medications, devices and surgical management might be considered.  What can you do?  If you are overweight or obese and are interested in methods for weight loss, you should discuss this with your provider.     Consider reducing daily calorie intake by 500 calories.     Keep a food journal.     Avoiding skipping meals, consider cutting portions instead.    Diet combined with exercise helps maintain muscle while optimizing fat loss. Strength training is particularly important for building and maintaining muscle mass. Exercise helps reduce stress, increase energy, and improves fitness.  Increasing exercise without diet control, however, may not burn enough calories to loose weight.       Start walking three days a week 10-20 minutes at a time    Work towards walking thirty minutes five days a week     Eventually, increase the speed of your walking for 1-2 minutes at time    In addition, we recommend that you review healthy lifestyles and methods for weight loss available through the National Institutes of Health patient information sites:  http://win.niddk.nih.gov/publications/index.htm    And look into health and wellness programs that may be available through your health insurance provider, employer, local community center, or fior club.

## 2022-08-30 NOTE — TELEPHONE ENCOUNTER
Reason for Call:  Other prescription    Detailed comments: Patient went to pharmacy to  his Lamictal, and Bupropion, the patient was told he would have to pay full price.  PLEASE CHANGE PATIENTS PHARMACY to [facility] for insurance to pay for meds.    Phone Number Patient can be reached at: Home number on file [phone] (home)    Best Time: any    Can we leave a detailed message on this number? YES    Call taken on 8/30/2022 at 4:52 PM by DIXON PEREZ

## 2022-08-30 NOTE — PROGRESS NOTES
"  Assessment & Plan     Snoring  Nocturnal gasping noted and otherwise asymptomatic patient.  Sleep study does not confirm obstructive sleep apnea.  Discussed the differential including mild sleep apnea not picked up on the home sleep test, central sleep apnea and other sleep disorders.  He will consider further work-up with an in lab test.    Recurrent depressive disorder (H)  Stable    Mood disorder (H)  Stable  - buPROPion (WELLBUTRIN XL) 300 MG 24 hr tablet; Take 1 tablet (300 mg) by mouth daily  - lamoTRIgine (LAMICTAL) 200 MG tablet; Take 1 tablet (200 mg) by mouth daily    Bipolar disorder, in full remission, most recent episode depressed (H)  Stable               Nicotine/Tobacco Cessation:  He reports that he has been smoking cigarettes. He has never used smokeless tobacco.  Nicotine/Tobacco Cessation Plan:         BMI:   Estimated body mass index is 27.68 kg/m  as calculated from the following:    Height as of 4/21/22: 1.778 m (5' 10\").    Weight as of this encounter: 87.5 kg (192 lb 14.4 oz).           No follow-ups on file.    Yonis Cisneros MD  [facility]    Reina Jaime is a 37 year old accompanied by his self, presenting for the following health issues:  Study Results      HPI     Patient is here for follow-up of a home sleep test because of gasping and mild snoring.  Patient denies daytime sleepiness.  No leg complaints.  No family history of sleep apnea.  He needs his lamotrigine and bupropion refilled.  He will be seeing Dr. Mayorga for Suboxone.    Review of Systems   No daytime sleepiness, fatigue, weight change, headache, heartburn, edema, hypertension.  Occasional nocturia.      Objective    /80   Pulse 75   Temp 98  F (36.7  C) (Tympanic)   Wt 87.5 kg (192 lb 14.4 oz)   SpO2 98%   BMI 27.68 kg/m    Body mass index is 27.68 kg/m .  Physical Exam   Healthy-appearing man in no distress.  Alert and oriented.  Mallampati 1.  Cranial nerves normal.  " Chest clear.  Cardiac exam regular.  No edema.    Home sleep test 7/21/2022 with AHI of 3.5 and oximetry kiera of 91%                .  ..

## 2022-08-30 NOTE — PROGRESS NOTES
"  Chief Complaint   Patient presents with     Study Results       Chung Cordero is a 37 year old male who returns to [facility] for review of sleep testing results. He presented with {SLP Symptoms:755772}.    Estimated body mass index is 27.68 kg/m  as calculated from the following:    Height as of 22: 1.778 m (5' 10\").    Weight as of this encounter: 87.5 kg (192 lb 14.4 oz).  {SLP [place] Sleepiness Scale:762815::\"Total score - [place]: 12 (2022  3:43 PM)\"}  {SLP Last Stop Ban::\"No data recorded\",\"STOP-BANG: ***/8\"}    Home Sleep Apnea Testing - {SLP Yesterday:968180}: 192 lbs 14.4 oz: AHI ***/hr. Supine AHI ***/hr.   Oxygen Petros of ***%.  Baseline ***%.  Sp02 =< 88% for *** minutes  He slept on his back (***%), prone (***%), left (***) and right (***%) sides.   Analysis time: *** minutes.     Signal quality of Oxymeter ***% {SLP Good/Fair/Poor:329646::\"Good\"}  Nasal Cannula ***% {SLP Good/Fair/Poor:879618::\"Good\"}  RIP belts ***% {SLP Good/Fair/Poor:340133::\"Good\"}.         Chung Cordero reports that he slept {GOOD/FAIR/POOR:427587:o} .     Results were reviewed in detail today with Chung and a copy given to him for his records.    Reviewed by team:  Tobacco  Allergies  Meds              Reviewed by provider:                     Problem List:  Patient Active Problem List    Diagnosis Date Noted     Bipolar disorder (H) 2022     Priority: Medium     Recurrent depressive disorder (H) 2022     Priority: Medium     Drug dependence (H) 2008     Priority: Medium        /80   Pulse 75   Temp 98  F (36.7  C) (Tympanic)   Wt 87.5 kg (192 lb 14.4 oz)   SpO2 98%   BMI 27.68 kg/m      Impression/Plan:  ***  {MILD MOD:656506} Obstructive Sleep Apnea.   Sleep associated hypoxemia {WAS / WAS NOT:541582} present.     Treatment options discussed today including {HSTTREATMENTOPTIONS:364122::\"auto-CPAP at ***-*** cmH2O\"}.  Elected " "treatment with {HSTTREATMENTOPTIONS:669310::\"auto-CPAP at ***-*** cmH2O\"}.    He will follow up with me in about {NUMBERS 1-12:824079::\"2\"} {WEEKS/MONTHS:463175}.     {TIMES:633378} minutes spent with patient, all of which were spent face-to-face counseling, consulting, coordinating plan of care.      Yonis Cisneros MD    CC:  Can Bowers, *** (only for reference, does not automatically route).  "

## 2022-08-30 NOTE — PROGRESS NOTES
"HOME SLEEP STUDY INTERPRETATION        Patient: Chung Cordero  MRN: [id number]  YOB: 1985  Study Date: 7/21/2022  PCP/Referring Provider: Can Bowers;   Ordering Provider: [unfilled]         Indications for Home Study: Chung Cordero is a 37 year old male with a history of bipolar disorder who presents with symptoms suggestive of obstructive sleep apnea.    Estimated body mass index is 27.68 kg/m  as calculated from the following:    Height as of 4/21/22: 1.778 m (5' 10\").    Weight as of 8/30/22: 87.5 kg (192 lb 14.4 oz).  [place] Sleepiness Scale: 14/24          Data: A full night home sleep study was performed recording the standard physiologic parameters including body position, movement, sound, nasal pressure, thermal oral airflow, chest and abdominal movements with respiratory inductance plethysmography, and oxygen saturation by pulse oximetry. Pulse rate was estimated by oximetry recording. This study was considered adequate based on > 4 hours of quality oximetry and respiratory recording. As specified by the AASM Manual for the Scoring of Sleep and Associated events, version 2.3, Rule VIII.D 1B, 4% oxygen desaturation scoring for hypopneas is used as a standard of care on all home sleep apnea testing.        Analysis Time:  373 minutes        Respiration:   Sleep Associated Hypoxemia: sustained hypoxemia was not present. Baseline oxygen saturation was 95.1%.  Time with saturation less than or equal to 88% was 0 minutes. The lowest oxygen saturation was 91%.   Snoring: Snoring was present.  Respiratory events: The home study revealed a presence of 0 obstructive apneas and 6 mixed and central apneas.   Pattern: Excluding events noted above, respiratory rate and pattern was Normal.      Position: Percent of time spent: supine - 85.2%, prone - 0%, on left - 14.8%, on right - 0%.      Heart Rate: By pulse oximetry normal rate was noted.       Assessment:     No obstructive " sleep apnea.    Sleep associated hypoxemia was not present.    Recommendations:    Consider in lab study.    Suggest optimizing sleep hygiene and avoiding sleep deprivation.    Weight management.        Diagnosis Code(s): Snoring R06.83    Yonis Cisneros MD, August 30, 2022   Diplomate, American Board of Internal Medicine, Sleep Medicine

## 2022-10-18 ENCOUNTER — OFFICE VISIT (OUTPATIENT)
Dept: FAMILY MEDICINE | Facility: CLINIC | Age: 37
End: 2022-10-18
Payer: COMMERCIAL

## 2022-10-18 VITALS
SYSTOLIC BLOOD PRESSURE: 131 MMHG | DIASTOLIC BLOOD PRESSURE: 82 MMHG | HEART RATE: 75 BPM | WEIGHT: 193.1 LBS | TEMPERATURE: 97.9 F | BODY MASS INDEX: 27.64 KG/M2 | HEIGHT: 70 IN

## 2022-10-18 DIAGNOSIS — F11.90 OPIOID USE DISORDER: Primary | ICD-10-CM

## 2022-10-18 DIAGNOSIS — F33.9 RECURRENT DEPRESSIVE DISORDER (H): ICD-10-CM

## 2022-10-18 DIAGNOSIS — F31.76 BIPOLAR DISORDER, IN FULL REMISSION, MOST RECENT EPISODE DEPRESSED (H): ICD-10-CM

## 2022-10-18 DIAGNOSIS — F19.20 CHEMICAL DEPENDENCY (H): ICD-10-CM

## 2022-10-18 PROCEDURE — 99213 OFFICE O/P EST LOW 20 MIN: CPT | Performed by: FAMILY MEDICINE

## 2022-10-18 RX ORDER — BUPRENORPHINE AND NALOXONE 2; .5 MG/1; MG/1
FILM, SOLUBLE BUCCAL; SUBLINGUAL
Qty: 3 FILM | Refills: 5 | Status: SHIPPED | OUTPATIENT
Start: 2022-10-18 | End: 2023-03-19

## 2022-10-18 RX ORDER — BUPRENORPHINE AND NALOXONE 2; .5 MG/1; MG/1
FILM, SOLUBLE BUCCAL; SUBLINGUAL
Qty: 3 FILM | Refills: 5 | Status: SHIPPED | OUTPATIENT
Start: 2022-10-18 | End: 2022-10-18

## 2022-10-18 ASSESSMENT — PATIENT HEALTH QUESTIONNAIRE - PHQ9
10. IF YOU CHECKED OFF ANY PROBLEMS, HOW DIFFICULT HAVE THESE PROBLEMS MADE IT FOR YOU TO DO YOUR WORK, TAKE CARE OF THINGS AT HOME, OR GET ALONG WITH OTHER PEOPLE: SOMEWHAT DIFFICULT
SUM OF ALL RESPONSES TO PHQ QUESTIONS 1-9: 6
SUM OF ALL RESPONSES TO PHQ QUESTIONS 1-9: 6

## 2022-10-18 ASSESSMENT — LIFESTYLE VARIABLES
AUDIT-C TOTAL SCORE: 1
HOW OFTEN DO YOU HAVE SIX OR MORE DRINKS ON ONE OCCASION: NEVER
HOW OFTEN DO YOU HAVE A DRINK CONTAINING ALCOHOL: MONTHLY OR LESS
HOW MANY STANDARD DRINKS CONTAINING ALCOHOL DO YOU HAVE ON A TYPICAL DAY: 1 OR 2
SKIP TO QUESTIONS 9-10: 1

## 2022-10-18 NOTE — PROGRESS NOTES
"  Assessment & Plan     Opioid use disorder  Stable, continue with current dose of Suboxone    Bipolar disorder, in full remission, most recent episode depressed (H)  Stable, continue with current medication    Recurrent depressive disorder (H)  Stable and in remission, continue with current medication    - buprenorphine HCl-naloxone HCl (SUBOXONE) 2-0.5 MG per film; 0.075-0.25 EA, sl, daily, # 3 EA, 0 Refill(s), Type: Maintenance, [facility]: [facility], [facility], 0.075-0.25 EA SL daily, 70, in, 02/25/21 15:39:00 CST, Height Measured, 197.4, lb, 02/25/21 15:39:00 CST, Weight Measured                 No follow-ups on file.    Can Mayorga MD  [facility]    Reina Jaime is a 37 year old accompanied by his self, presenting for the following health issues:  Follow Up (Suboxone follow up)      History of Present Illness       Reason for visit:  Suboxone recheck    He eats 0-1 servings of fruits and vegetables daily.He consumes 0 sweetened beverage(s) daily.He exercises with enough effort to increase his heart rate 10 to 19 minutes per day.  He exercises with enough effort to increase his heart rate 3 or less days per week.   He is taking medications regularly.    Today's PHQ-9         PHQ-9 Total Score: 6    PHQ-9 Q9 Thoughts of better off dead/self-harm past 2 weeks :   Not at all    How difficult have these problems made it for you to do your work, take care of things at home, or get along with other people: Somewhat difficult     Patient has been doing well.  No cravings.  He denies use of marijuana or other illicit drugs.  PDMP reviewed with no concerns.        Review of Systems   Constitutional, HEENT, cardiovascular, pulmonary, gi and gu systems are negative, except as otherwise noted.      Objective    /82 (BP Location: Right arm, Patient Position: Sitting, Cuff Size: Adult Large)   Pulse 75   Temp 97.9  F (36.6  C) (Tympanic)   Ht 1.778 m (5' 10\")   Wt " 87.6 kg (193 lb 1.6 oz)   BMI 27.71 kg/m    Body mass index is 27.71 kg/m .  Physical Exam   General:  Alert and oriented, No acute distress.    Eye: Normal conjunctiva.     HENT:  Oral mucosa is moist.     Neck:  Supple.     Respiratory:  Respirations are non-labored.     Cardiovascular:  Normal rate   Musculoskeletal:  Normal gait.     Psychiatric:  Cooperative, Appropriate mood & affect, Normal judgment.

## 2023-03-12 DIAGNOSIS — F39 MOOD DISORDER (H): ICD-10-CM

## 2023-03-14 RX ORDER — BUPROPION HYDROCHLORIDE 300 MG/1
TABLET ORAL
Qty: 95 TABLET | Refills: 0 | Status: SHIPPED | OUTPATIENT
Start: 2023-03-14 | End: 2023-05-16

## 2023-03-14 RX ORDER — LAMOTRIGINE 200 MG/1
TABLET ORAL
Qty: 95 TABLET | Refills: 0 | Status: SHIPPED | OUTPATIENT
Start: 2023-03-14 | End: 2023-05-16

## 2023-03-14 NOTE — TELEPHONE ENCOUNTER
"      Last office visit: 10/18/2022   8/30/22 (patient seen with Dr. Yonis Cisneros MD for Sleep).      Future Appointments 3/14/2023 - 9/10/2023    None          Requested Prescriptions   Pending Prescriptions Disp Refills     lamoTRIgine (LAMICTAL) 200 MG tablet [Pharmacy Med Name: LAMOTRIGINE TABS 200MG] 95 tablet 3     Sig: TAKE 1 TABLET DAILY       Anti-Seizure Meds Protocol  Failed - 3/12/2023  5:56 PM        Failed - Review Authorizing provider's last note.      Refer to last progress notes: confirm request is for original authorizing provider (cannot be through other providers).          Failed - Normal CBC on file in past 26 months     No lab results found.              Failed - Normal platelet count on file in past 26 months     No lab results found.            Passed - Recent (12 mo) or future (30 days) visit within the authorizing provider's specialty     Patient has had an office visit with the authorizing provider or a provider within the authorizing providers department within the previous 12 mos or has a future within next 30 days. See \"Patient Info\" tab in inbasket, or \"Choose Columns\" in Meds & Orders section of the refill encounter.              Passed - Normal ALT or AST on file in past 26 months     Recent Labs   Lab Test 02/25/21  1632   ALT 24     Recent Labs   Lab Test 02/25/21  1632   AST 23             Passed - Medication is active on med list           buPROPion (WELLBUTRIN XL) 300 MG 24 hr tablet [Pharmacy Med Name: BUPROPION HCL XL TABS 300MG] 95 tablet 3     Sig: TAKE 1 TABLET DAILY       SSRIs Protocol Passed - 3/12/2023  5:56 PM        Passed - Recent (12 mo) or future (30 days) visit within the authorizing provider's specialty     Patient has had an office visit with the authorizing provider or a provider within the authorizing providers department within the previous 12 mos or has a future within next 30 days. See \"Patient Info\" tab in inbasket, or \"Choose Columns\" in Meds & Orders " section of the refill encounter.              Passed - Medication is Bupropion     If the medication is Bupropion (Wellbutrin), and the patient is taking for smoking cessation; OK to refill.          Passed - Medication is active on med list        Passed - Patient is age 18 or older

## 2023-05-16 ENCOUNTER — TELEPHONE (OUTPATIENT)
Dept: FAMILY MEDICINE | Facility: CLINIC | Age: 38
End: 2023-05-16

## 2023-05-16 ENCOUNTER — OFFICE VISIT (OUTPATIENT)
Dept: FAMILY MEDICINE | Facility: CLINIC | Age: 38
End: 2023-05-16
Payer: COMMERCIAL

## 2023-05-16 VITALS
DIASTOLIC BLOOD PRESSURE: 72 MMHG | RESPIRATION RATE: 20 BRPM | SYSTOLIC BLOOD PRESSURE: 109 MMHG | HEIGHT: 70 IN | HEART RATE: 74 BPM | OXYGEN SATURATION: 96 % | WEIGHT: 193.2 LBS | TEMPERATURE: 98.5 F | BODY MASS INDEX: 27.66 KG/M2

## 2023-05-16 DIAGNOSIS — F11.90 OPIOID USE DISORDER: Primary | ICD-10-CM

## 2023-05-16 DIAGNOSIS — F11.90 OPIOID USE DISORDER: ICD-10-CM

## 2023-05-16 DIAGNOSIS — F39 MOOD DISORDER (H): ICD-10-CM

## 2023-05-16 LAB
AMPHETAMINES UR QL: NOT DETECTED
BARBITURATES UR QL SCN: NOT DETECTED
BENZODIAZ UR QL SCN: NOT DETECTED
BUPRENORPHINE UR QL: NOT DETECTED
CANNABINOIDS UR QL: NOT DETECTED
COCAINE UR QL SCN: NOT DETECTED
D-METHAMPHET UR QL: NOT DETECTED
METHADONE UR QL SCN: NOT DETECTED
OPIATES UR QL SCN: NOT DETECTED
OXYCODONE UR QL SCN: NOT DETECTED
PCP UR QL SCN: NOT DETECTED
PROPOXYPH UR QL: NOT DETECTED
TRICYCLICS UR QL SCN: NOT DETECTED

## 2023-05-16 PROCEDURE — 80306 DRUG TEST PRSMV INSTRMNT: CPT | Performed by: FAMILY MEDICINE

## 2023-05-16 PROCEDURE — 99213 OFFICE O/P EST LOW 20 MIN: CPT | Performed by: FAMILY MEDICINE

## 2023-05-16 RX ORDER — BUPRENORPHINE AND NALOXONE 2; .5 MG/1; MG/1
FILM, SOLUBLE BUCCAL; SUBLINGUAL
Qty: 3 FILM | Refills: 5 | Status: SHIPPED | OUTPATIENT
Start: 2023-05-16 | End: 2023-05-16

## 2023-05-16 RX ORDER — BUPROPION HYDROCHLORIDE 300 MG/1
300 TABLET ORAL DAILY
Qty: 95 TABLET | Refills: 1 | Status: SHIPPED | OUTPATIENT
Start: 2023-05-16 | End: 2023-11-15

## 2023-05-16 RX ORDER — BUPRENORPHINE AND NALOXONE 2; .5 MG/1; MG/1
FILM, SOLUBLE BUCCAL; SUBLINGUAL
Qty: 3 FILM | Refills: 5 | Status: SHIPPED | OUTPATIENT
Start: 2023-05-16 | End: 2023-11-15

## 2023-05-16 RX ORDER — LAMOTRIGINE 200 MG/1
200 TABLET ORAL DAILY
Qty: 95 TABLET | Refills: 0 | Status: SHIPPED | OUTPATIENT
Start: 2023-05-16 | End: 2023-08-18

## 2023-05-16 ASSESSMENT — PATIENT HEALTH QUESTIONNAIRE - PHQ9
10. IF YOU CHECKED OFF ANY PROBLEMS, HOW DIFFICULT HAVE THESE PROBLEMS MADE IT FOR YOU TO DO YOUR WORK, TAKE CARE OF THINGS AT HOME, OR GET ALONG WITH OTHER PEOPLE: NOT DIFFICULT AT ALL
SUM OF ALL RESPONSES TO PHQ QUESTIONS 1-9: 5
SUM OF ALL RESPONSES TO PHQ QUESTIONS 1-9: 5

## 2023-05-16 NOTE — LETTER
May 16, 2023      Addison Tolentinojuwan  [address] [place] [address]        Dear ,    We are writing to inform you of your test results.    Your test results fall within the expected range(s) or remain unchanged from previous results.  Please continue with current treatment plan.    Resulted Orders   Urine Drugs of Abuse Screen Panel 13   Result Value Ref Range    Cannabinoids (51-tno-2-carboxy-9-THC) Not Detected Not Detected, Indeterminate      Comment:      Cutoff for a negative cannabinoid is 50 ng/mL or less.    Phencyclidine Not Detected Not Detected, Indeterminate      Comment:      Cutoff for a negative PCP is 25 ng/mL or less.    Cocaine (Benzoylecgonine) Not Detected Not Detected, Indeterminate      Comment:      Cutoff for a negative cocaine is 150 ng/ml or less.    Methamphetamine (d-Methamphetamine) Not Detected Not Detected, Indeterminate      Comment:      Cutoff for a negative methamphetamine is 500 ng/ml or less.    Opiates (Morphine) Not Detected Not Detected, Indeterminate      Comment:      Cutoff for a negative opiate is 100 ng/ml or less.    Amphetamine (d-Amphetamine) Not Detected Not Detected, Indeterminate      Comment:      Cutoff for a negative amphetamine is 500 ng/mL or less.    Benzodiazepines (Nordiazepam) Not Detected Not Detected, Indeterminate      Comment:      Cutoff for a negative benzodiazepine is 150 ng/ml or less.    Tricyclic Antidepressants (Desipramine) Not Detected Not Detected, Indeterminate      Comment:      Cutoff for a negative tricyclic antidepressant is 300 ng/ml or less.    Methadone Not Detected Not Detected, Indeterminate      Comment:      Cutoff for a negative methadone is 200 ng/ml or less.    Barbiturates (Butalbital) Not Detected Not Detected, Indeterminate      Comment:      Cutoff for a negative barbituate is 200 ng/ml or less.    Oxycodone Not Detected Not Detected, Indeterminate      Comment:      Cutoff for a negative  oxycodone is 100 ng/mL or less.    Propoxyphene (Norpropoxyphene) Not Detected Not Detected, Indeterminate      Comment:      Cutoff for a negative propoxyphene is 300 ng/ml or less.    Buprenorphine Not Detected Not Detected, Indeterminate      Comment:      Cutoff for a negative buprenorphine is 10 ng/ml or less.       If you have any questions or concerns, please call the clinic at the number listed above.       Sincerely,      Can Mayorga MD

## 2023-05-16 NOTE — PROGRESS NOTES
"  Assessment & Plan     Opioid use disorder  Currently doing well.  He has weaned the buprenorphine-naloxone to 8/10 of a film daily.  He denies any side effects or withdrawal symptoms.  Cravings are controlled.  PDMP queried with no concerns.  - buprenorphine HCl-naloxone HCl (SUBOXONE) 2-0.5 MG per film; 0.075-0.25 EACH, UNDER THE TONGUE, DAILY, # THREE EACH, TAKE ONE-HALF TABLET REFILL(S), TYPE: MAINTENANCE, PHARMACY: [facility], [facility], 0.075-0.25 EACH UNDER THE TONGUE DAILY, 70, IN, 02/25/21 15:39:00 CST, HEIGHT MEASURED, 197.4, LB, 02/25/21 15:39:00 CST, WEIGHT MEASURED  - Urine Drugs of Abuse Screen; Future    Mood disorder (H)  Stable, continue with current medication  - buPROPion (WELLBUTRIN XL) 300 MG 24 hr tablet; Take 1 tablet (300 mg) by mouth daily  - lamoTRIgine (LAMICTAL) 200 MG tablet; Take 1 tablet (200 mg) by mouth daily             BMI:   Estimated body mass index is 27.72 kg/m  as calculated from the following:    Height as of this encounter: 1.778 m (5' 10\").    Weight as of this encounter: 87.6 kg (193 lb 3.2 oz).           Can Mayorga MD  [facility]    Reina Jaime is a 38 year old, presenting for the following health issues:  Follow Up (Follow up suboxone, needs refills)        5/16/2023     4:33 PM   Additional Questions   Roomed by Nayla BUTLER CMA   Accompanied by Self         5/16/2023     4:33 PM   Patient Reported Additional Medications   Patient reports taking the following new medications None     History of Present Illness       Reason for visit:  Medication refill    He eats 2-3 servings of fruits and vegetables daily.He consumes 1 sweetened beverage(s) daily.He exercises with enough effort to increase his heart rate 10 to 19 minutes per day.  He exercises with enough effort to increase his heart rate 4 days per week.   He is taking medications regularly.    Today's PHQ-9         PHQ-9 Total Score: 5    PHQ-9 Q9 Thoughts of better off " "dead/self-harm past 2 weeks :   Not at all    How difficult have these problems made it for you to do your work, take care of things at home, or get along with other people: Not difficult at all         He is currently taking 2 mg of buprenorphine daily. This is taken in 1 dose(s) daily.     Status Since Last Visit:    Have you used any opioids since your last visit?: no use since last visit    Do you feel that your dose of suboxone is too high or too low? Adequate    Have there been cravings for opioids? No     Any withdrawal symptoms? none      Any side effects from the medication? none    Any alcohol use? rare    Any other recreational drug use? no      Precipitating Factors:    Triggers have been: non-existent   Other Supports:    Do you attend counseling or meet with a therapist? No    Do you attend NA or AA meetings? No    Do you have/meet with a sponsor? No    Family and support systems have been: good    What other goals have you been working on (job, family, relationships, etc)? n/a        PDMP Review       Value Time User    State PDMP site checked  Yes 5/16/2023  5:09 PM Can Mayorga MD                  Review of Systems   Constitutional, HEENT, cardiovascular, pulmonary, gi and gu systems are negative, except as otherwise noted.      Objective    /72 (BP Location: Right arm, Patient Position: Sitting, Cuff Size: Adult Large)   Pulse 74   Temp 98.5  F (36.9  C) (Tympanic)   Resp 20   Ht 1.778 m (5' 10\")   Wt 87.6 kg (193 lb 3.2 oz)   SpO2 96%   BMI 27.72 kg/m    Body mass index is 27.72 kg/m .  Physical Exam   General:  Alert and oriented, No acute distress.    Eye: Normal conjunctiva.     HENT:  Oral mucosa is moist.     Neck:  Supple.     Respiratory:  Respirations are non-labored.     Cardiovascular:  Normal rate   Musculoskeletal:  Normal gait.     Psychiatric:  Cooperative, Appropriate mood & affect, Normal judgment.                       "

## 2023-05-16 NOTE — TELEPHONE ENCOUNTER
Patient is at [facility], and the Pharmacy stated the order did not go through.    buprenorphine HCl-naloxone HCl (SUBOXONE) 2-0.5 MG per film [[id number]] (Order [id number])      Medication tab lists this:  An error was encountered while attempting to calculate the morphine milligram equivalents per day for at least one order.

## 2023-05-17 RX ORDER — BUPRENORPHINE AND NALOXONE 2; .5 MG/1; MG/1
FILM, SOLUBLE BUCCAL; SUBLINGUAL
Qty: 3 FILM | Refills: 0 | OUTPATIENT
Start: 2023-05-17

## 2023-05-17 NOTE — TELEPHONE ENCOUNTER
This refill request is a duplicate request, previously received or sent.   Sent denial notification to pharmacy.  DAREN Balderrama  [facility]

## 2023-08-17 DIAGNOSIS — F39 MOOD DISORDER (H): ICD-10-CM

## 2023-08-18 RX ORDER — LAMOTRIGINE 200 MG/1
200 TABLET ORAL DAILY
Qty: 95 TABLET | Refills: 3 | Status: SHIPPED | OUTPATIENT
Start: 2023-08-18 | End: 2024-08-12

## 2023-08-18 NOTE — TELEPHONE ENCOUNTER
"Routing refill request to provider for review/approval because:  Labs not current    Last Written Prescription Date:  5/16/23  Last Fill Quantity: 95,  # refills: 0   Last office visit provider:  5/16/23     Requested Prescriptions   Pending Prescriptions Disp Refills    lamoTRIgine (LAMICTAL) 200 MG tablet [Pharmacy Med Name: LAMOTRIGINE TABS 200MG] 95 tablet 3     Sig: TAKE 1 TABLET DAILY       Anti-Seizure Meds Protocol  Failed - 8/17/2023 11:20 PM        Failed - Review Authorizing provider's last note.      Refer to last progress notes: confirm request is for original authorizing provider (cannot be through other providers).          Failed - Normal CBC on file in past 26 months     No lab results found.              Failed - Normal ALT or AST on file in past 26 months     Recent Labs   Lab Test 02/25/21  1632   ALT 24     Recent Labs   Lab Test 02/25/21  1632   AST 23             Failed - Normal platelet count on file in past 26 months     No lab results found.            Passed - Recent (12 mo) or future (30 days) visit within the authorizing provider's specialty     Patient has had an office visit with the authorizing provider or a provider within the authorizing providers department within the previous 12 mos or has a future within next 30 days. See \"Patient Info\" tab in inbasket, or \"Choose Columns\" in Meds & Orders section of the refill encounter.              Passed - Medication is active on med list             MITCHELL TAVAREZ RN 08/18/23 8:16 AM  "

## 2023-11-15 ENCOUNTER — OFFICE VISIT (OUTPATIENT)
Dept: FAMILY MEDICINE | Facility: CLINIC | Age: 38
End: 2023-11-15
Payer: COMMERCIAL

## 2023-11-15 VITALS
WEIGHT: 190 LBS | RESPIRATION RATE: 20 BRPM | TEMPERATURE: 98.3 F | HEIGHT: 70 IN | OXYGEN SATURATION: 98 % | BODY MASS INDEX: 27.2 KG/M2 | SYSTOLIC BLOOD PRESSURE: 115 MMHG | HEART RATE: 76 BPM | DIASTOLIC BLOOD PRESSURE: 76 MMHG

## 2023-11-15 DIAGNOSIS — F39 MOOD DISORDER (H): ICD-10-CM

## 2023-11-15 DIAGNOSIS — F11.90 OPIOID USE DISORDER: ICD-10-CM

## 2023-11-15 PROCEDURE — 99213 OFFICE O/P EST LOW 20 MIN: CPT | Mod: 25 | Performed by: FAMILY MEDICINE

## 2023-11-15 PROCEDURE — 90686 IIV4 VACC NO PRSV 0.5 ML IM: CPT | Performed by: FAMILY MEDICINE

## 2023-11-15 PROCEDURE — 90471 IMMUNIZATION ADMIN: CPT | Performed by: FAMILY MEDICINE

## 2023-11-15 RX ORDER — BUPROPION HYDROCHLORIDE 300 MG/1
300 TABLET ORAL DAILY
Qty: 95 TABLET | Refills: 3 | Status: SHIPPED | OUTPATIENT
Start: 2023-11-15

## 2023-11-15 RX ORDER — BUPRENORPHINE AND NALOXONE 2; .5 MG/1; MG/1
FILM, SOLUBLE BUCCAL; SUBLINGUAL
Qty: 3 FILM | Refills: 5 | Status: SHIPPED | OUTPATIENT
Start: 2023-11-15 | End: 2023-11-29

## 2023-11-15 ASSESSMENT — PATIENT HEALTH QUESTIONNAIRE - PHQ9: SUM OF ALL RESPONSES TO PHQ QUESTIONS 1-9: 5

## 2023-11-15 NOTE — PROGRESS NOTES
"  Assessment & Plan     Opioid use disorder  Patient is doing well with current dose.  He denies side effects of medication.  PDMP queried without concerns.  Refills for 6 months have been sent in.  - buprenorphine HCl-naloxone HCl (SUBOXONE) 2-0.5 MG per film; 0.075-0.25 EACH, UNDER THE TONGUE, DAILY, # THREE EACH, TAKE ONE-HALF TABLET REFILL(S), TYPE: MAINTENANCE, PHARMACY: [facility], [facility], 0.075-0.25 EACH UNDER THE TONGUE DAILY, 70, IN, 02/25/21 15:39:00 CST, HEIGHT MEASURED, 197.4, LB, 02/25/21 15:39:00 CST, WEIGHT MEASURED    Mood disorder (H24)  Stable, continue with current dose of bupropion and lamotrigine.  - buPROPion (WELLBUTRIN XL) 300 MG 24 hr tablet; Take 1 tablet (300 mg) by mouth daily             Nicotine/Tobacco Cessation:  He reports that he has been smoking cigarettes. He has been smoking an average of .5 packs per day. He has been exposed to tobacco smoke. He has never used smokeless tobacco.  Nicotine/Tobacco Cessation Plan:   Information offered: Patient not interested at this time      BMI:   Estimated body mass index is 27.26 kg/m  as calculated from the following:    Height as of this encounter: 1.778 m (5' 10\").    Weight as of this encounter: 86.2 kg (190 lb).           Can Mayorga MD  [facility]    Reina Jaime is a 38 year old, presenting for the following health issues:  Pain Management (Suboxone follow up)        11/15/2023     2:37 PM   Additional Questions   Roomed by Nayla BUTLER CMA   Accompanied by Self       HPI     He is currently taking 2 mg of buprenorphine daily. This is taken in 1 dose(s) daily.      Status Since Last Visit:  Have you used any opioids since your last visit?: no use since last visit  Do you feel that your dose of suboxone is too high or too low? Adequate  Have there been cravings for opioids? No   Any withdrawal symptoms? none               Any side effects from the medication? none  Any alcohol use? " "rare  Any other recreational drug use? no     Precipitating Factors:  Triggers have been: non-existent   Other Supports:  Do you attend counseling or meet with a therapist? No  Do you attend NA or AA meetings? No  Do you have/meet with a sponsor? No  Family and support systems have been: good  What other goals have you been working on (job, family, relationships, etc)? n/a               PDMP Review         Value Time User    State PDMP site checked  Yes 5/16/2023  5:09 PM Can Mayorga MD                  Review of Systems   Constitutional, HEENT, cardiovascular, pulmonary, gi and gu systems are negative, except as otherwise noted.      Objective    /76 (BP Location: Right arm, Patient Position: Sitting, Cuff Size: Adult Large)   Pulse 76   Temp 98.3  F (36.8  C) (Tympanic)   Resp 20   Ht 1.778 m (5' 10\")   Wt 86.2 kg (190 lb)   SpO2 98%   BMI 27.26 kg/m    Body mass index is 27.26 kg/m .  Physical Exam   General:  Alert and oriented, No acute distress.    Eye: Normal conjunctiva.     HENT:  Oral mucosa is moist.     Neck:  Supple.     Respiratory:  Respirations are non-labored.     Cardiovascular:  Normal rate   Musculoskeletal:  Normal gait.     Psychiatric:  Cooperative, Appropriate mood & affect, Normal judgment.                         "

## 2023-11-29 DIAGNOSIS — F11.90 OPIOID USE DISORDER: ICD-10-CM

## 2023-11-29 RX ORDER — BUPRENORPHINE AND NALOXONE 2; .5 MG/1; MG/1
0.25 FILM, SOLUBLE BUCCAL; SUBLINGUAL DAILY
Qty: 3 FILM | Refills: 5 | Status: SHIPPED | OUTPATIENT
Start: 2023-11-29 | End: 2024-06-04

## 2024-03-02 ENCOUNTER — HEALTH MAINTENANCE LETTER (OUTPATIENT)
Age: 39
End: 2024-03-02

## 2024-06-04 ENCOUNTER — OFFICE VISIT (OUTPATIENT)
Dept: FAMILY MEDICINE | Facility: CLINIC | Age: 39
End: 2024-06-04
Payer: COMMERCIAL

## 2024-06-04 VITALS
DIASTOLIC BLOOD PRESSURE: 70 MMHG | BODY MASS INDEX: 29.01 KG/M2 | HEIGHT: 70 IN | OXYGEN SATURATION: 99 % | TEMPERATURE: 97.5 F | HEART RATE: 72 BPM | WEIGHT: 202.6 LBS | RESPIRATION RATE: 20 BRPM | SYSTOLIC BLOOD PRESSURE: 111 MMHG

## 2024-06-04 DIAGNOSIS — F11.90 OPIOID USE DISORDER: ICD-10-CM

## 2024-06-04 PROCEDURE — 99213 OFFICE O/P EST LOW 20 MIN: CPT | Performed by: FAMILY MEDICINE

## 2024-06-04 RX ORDER — BUPRENORPHINE AND NALOXONE 2; .5 MG/1; MG/1
0.25 FILM, SOLUBLE BUCCAL; SUBLINGUAL DAILY
Qty: 3 FILM | Refills: 5 | Status: SHIPPED | OUTPATIENT
Start: 2024-06-04

## 2024-06-04 ASSESSMENT — PATIENT HEALTH QUESTIONNAIRE - PHQ9
10. IF YOU CHECKED OFF ANY PROBLEMS, HOW DIFFICULT HAVE THESE PROBLEMS MADE IT FOR YOU TO DO YOUR WORK, TAKE CARE OF THINGS AT HOME, OR GET ALONG WITH OTHER PEOPLE: NOT DIFFICULT AT ALL
SUM OF ALL RESPONSES TO PHQ QUESTIONS 1-9: 3
SUM OF ALL RESPONSES TO PHQ QUESTIONS 1-9: 3

## 2024-06-04 NOTE — PROGRESS NOTES
"  Assessment & Plan     Opioid use disorder  Patient is doing well with current dose of buprenorphine-naloxone.  PDMP queried with no concerns.  Follow-up in 6 months or sooner as needed.  - buprenorphine HCl-naloxone HCl (SUBOXONE) 2-0.5 MG per film; Place 0.25 Film under the tongue daily          BMI  Estimated body mass index is 29.07 kg/m  as calculated from the following:    Height as of this encounter: 1.778 m (5' 10\").    Weight as of this encounter: 91.9 kg (202 lb 9.6 oz).             Reina Jaime is a 39 year old, presenting for the following health issues:  Opioid Refill (Follow up Suboxone)      6/4/2024     4:31 PM   Additional Questions   Roomed by Nayla BUTLER CMA   Accompanied by Self     History of Present Illness       Reason for visit:  Med refill    He eats 0-1 servings of fruits and vegetables daily.He consumes 1 sweetened beverage(s) daily.He exercises with enough effort to increase his heart rate 20 to 29 minutes per day.  He exercises with enough effort to increase his heart rate 4 days per week.   He is taking medications regularly.         He is currently taking 0.25 mg of buprenorphine daily. This is taken in 1 dose(s) daily.     Status Since Last Visit:  Have you used any opioids since your last visit?: no use since last visit  Do you feel that your dose of suboxone is too high or too low? Adequate  Have there been cravings for opioids? No   Any withdrawal symptoms?  no     Any side effects from the medication?  no  Any alcohol use? rarely  Any other recreational drug use? no    Precipitating Factors:  Triggers have been: non-existent   Other Supports:  Do you attend counseling or meet with a therapist? No  Do you attend NA or AA meetings? No  Do you have/meet with a sponsor? No  Family and support systems have been: good  What other goals have you been working on (job, family, relationships, etc)?         PDMP Review         Value Time User    State PDMP site checked  Yes 11/15/2023  3:00 " "PM Can Mayorga MD                        Objective    /70 (BP Location: Right arm, Patient Position: Sitting, Cuff Size: Adult Large)   Pulse 72   Temp 97.5  F (36.4  C) (Tympanic)   Resp 20   Ht 1.778 m (5' 10\")   Wt 91.9 kg (202 lb 9.6 oz)   SpO2 99%   BMI 29.07 kg/m    Body mass index is 29.07 kg/m .  Physical Exam   General:  Alert and oriented, No acute distress.    Eye: Normal conjunctiva.     HENT:  Oral mucosa is moist.     Neck:  Supple.     Respiratory:  Respirations are non-labored.     Cardiovascular:  Normal rate   Musculoskeletal:  Normal gait.     Psychiatric:  Cooperative, Appropriate mood & affect, Normal judgment.               Signed Electronically by: Can Mayorga MD    "

## 2024-06-04 NOTE — LETTER

## 2024-08-11 DIAGNOSIS — F39 MOOD DISORDER (H): ICD-10-CM

## 2024-08-12 RX ORDER — LAMOTRIGINE 200 MG/1
200 TABLET ORAL DAILY
Qty: 90 TABLET | Refills: 3 | Status: SHIPPED | OUTPATIENT
Start: 2024-08-12

## 2024-11-10 DIAGNOSIS — F39 MOOD DISORDER (H): ICD-10-CM

## 2024-11-11 RX ORDER — BUPROPION HYDROCHLORIDE 300 MG/1
300 TABLET ORAL DAILY
Qty: 90 TABLET | Refills: 3 | Status: SHIPPED | OUTPATIENT
Start: 2024-11-11

## 2024-12-04 ENCOUNTER — OFFICE VISIT (OUTPATIENT)
Dept: FAMILY MEDICINE | Facility: CLINIC | Age: 39
End: 2024-12-04
Payer: COMMERCIAL

## 2024-12-04 VITALS
DIASTOLIC BLOOD PRESSURE: 84 MMHG | HEART RATE: 75 BPM | HEIGHT: 70 IN | RESPIRATION RATE: 18 BRPM | SYSTOLIC BLOOD PRESSURE: 131 MMHG | BODY MASS INDEX: 28.5 KG/M2 | TEMPERATURE: 97.1 F | OXYGEN SATURATION: 100 % | WEIGHT: 199.1 LBS

## 2024-12-04 DIAGNOSIS — Z30.09 VASECTOMY EVALUATION: Primary | ICD-10-CM

## 2024-12-04 DIAGNOSIS — F11.90 OPIOID USE DISORDER: ICD-10-CM

## 2024-12-04 PROCEDURE — 99214 OFFICE O/P EST MOD 30 MIN: CPT | Performed by: FAMILY MEDICINE

## 2024-12-04 PROCEDURE — G2211 COMPLEX E/M VISIT ADD ON: HCPCS | Performed by: FAMILY MEDICINE

## 2024-12-04 RX ORDER — BUPRENORPHINE AND NALOXONE 2; .5 MG/1; MG/1
0.25 FILM, SOLUBLE BUCCAL; SUBLINGUAL DAILY
Qty: 3 FILM | Refills: 5 | Status: SHIPPED | OUTPATIENT
Start: 2024-12-04

## 2024-12-04 ASSESSMENT — PATIENT HEALTH QUESTIONNAIRE - PHQ9: SUM OF ALL RESPONSES TO PHQ QUESTIONS 1-9: 1

## 2024-12-04 NOTE — PROGRESS NOTES
"  Assessment & Plan     Opioid use disorder  Patient is doing well at his current dose.  He will continue with Suboxone and follow-up in 6 months.  - buprenorphine HCl-naloxone HCl (SUBOXONE) 2-0.5 MG per film; Place 0.25 Film under the tongue daily.    Vasectomy evaluation  We discussed the vasectomy procedure and follow up. He was counseled about immediate follow up after the procedure and complications including, infection, bleeding, sperm granuloma, sperm antibodies, testicular congestion, epididymal discomfort, and spontaneous recanalization. Patient expressed understanding.            BMI  Estimated body mass index is 28.57 kg/m  as calculated from the following:    Height as of this encounter: 1.778 m (5' 10\").    Weight as of this encounter: 90.3 kg (199 lb 1.6 oz).             Reina Jaime is a 39 year old, presenting for the following health issues:  Recheck Medication      12/4/2024     2:22 PM   Additional Questions   Roomed by Yuval     HPI             He is currently taking 2 0.5 mg of buprenorphine daily. This is taken in 1 dose(s) daily.     Status Since Last Visit:  Have you used any opioids since your last visit?: no use since last visit  Do you feel that your dose of suboxone is too high or too low? Adequate  Have there been cravings for opioids? No   Any withdrawal symptoms?  none     Any side effects from the medication?  none  Any alcohol use? occ  Any other recreational drug use? no    Precipitating Factors:  Triggers have been: non-existent   Patient would also like to discuss vasectomy.  He is planning on getting  in the near future.  His fiancée he want any more children.  They both have children from previous marriages.      PDMP Review         Value Time User    State PDMP site checked  Yes 6/4/2024  4:53 PM Can Mayorga MD                      Objective    /84   Pulse 75   Temp 97.1  F (36.2  C) (Tympanic)   Resp 18   Ht 1.778 m (5' 10\")   Wt 90.3 kg " (199 lb 1.6 oz)   SpO2 100%   BMI 28.57 kg/m    Body mass index is 28.57 kg/m .  Physical Exam   General:  Alert and oriented, No acute distress.    Eye: Normal conjunctiva.     HENT:  Oral mucosa is moist.     Neck:  Supple.     Respiratory:  Respirations are non-labored.     Cardiovascular:  Normal rate   Musculoskeletal:  Normal gait.     Psychiatric:  Cooperative, Appropriate mood & affect, Normal judgment.               Signed Electronically by: Can Mayorga MD

## 2025-03-15 ENCOUNTER — HEALTH MAINTENANCE LETTER (OUTPATIENT)
Age: 40
End: 2025-03-15

## 2025-06-03 ENCOUNTER — RESULTS FOLLOW-UP (OUTPATIENT)
Dept: FAMILY MEDICINE | Facility: CLINIC | Age: 40
End: 2025-06-03

## 2025-06-03 ENCOUNTER — OFFICE VISIT (OUTPATIENT)
Dept: FAMILY MEDICINE | Facility: CLINIC | Age: 40
End: 2025-06-03
Payer: COMMERCIAL

## 2025-06-03 VITALS
OXYGEN SATURATION: 99 % | HEIGHT: 70 IN | DIASTOLIC BLOOD PRESSURE: 75 MMHG | RESPIRATION RATE: 16 BRPM | HEART RATE: 69 BPM | SYSTOLIC BLOOD PRESSURE: 126 MMHG | TEMPERATURE: 97.6 F | WEIGHT: 201.8 LBS | BODY MASS INDEX: 28.89 KG/M2

## 2025-06-03 DIAGNOSIS — F11.90 OPIOID USE DISORDER: Primary | ICD-10-CM

## 2025-06-03 LAB
AMPHETAMINES UR QL: NOT DETECTED
BARBITURATES UR QL SCN: NOT DETECTED
BENZODIAZ UR QL SCN: NOT DETECTED
BUPRENORPHINE UR QL: NOT DETECTED
CANNABINOIDS UR QL: NOT DETECTED
COCAINE UR QL SCN: NOT DETECTED
D-METHAMPHET UR QL: NOT DETECTED
METHADONE UR QL SCN: NOT DETECTED
OPIATES UR QL SCN: NOT DETECTED
OXYCODONE UR QL SCN: NOT DETECTED
PCP UR QL SCN: NOT DETECTED
TRICYCLICS UR QL SCN: NOT DETECTED

## 2025-06-03 PROCEDURE — 80306 DRUG TEST PRSMV INSTRMNT: CPT | Performed by: FAMILY MEDICINE

## 2025-06-03 PROCEDURE — 99213 OFFICE O/P EST LOW 20 MIN: CPT | Performed by: FAMILY MEDICINE

## 2025-06-03 PROCEDURE — 3078F DIAST BP <80 MM HG: CPT | Performed by: FAMILY MEDICINE

## 2025-06-03 PROCEDURE — G2211 COMPLEX E/M VISIT ADD ON: HCPCS | Performed by: FAMILY MEDICINE

## 2025-06-03 PROCEDURE — 3074F SYST BP LT 130 MM HG: CPT | Performed by: FAMILY MEDICINE

## 2025-06-03 RX ORDER — BUPRENORPHINE AND NALOXONE 2; .5 MG/1; MG/1
0.25 FILM, SOLUBLE BUCCAL; SUBLINGUAL DAILY
Qty: 3 FILM | Refills: 5 | Status: SHIPPED | OUTPATIENT
Start: 2025-06-03

## 2025-06-03 NOTE — LETTER

## 2025-06-03 NOTE — PROGRESS NOTES
"  Assessment & Plan     Opioid use disorder  Controlled, continue current medication  Follow up in 6 months  - buprenorphine HCl-naloxone HCl (SUBOXONE) 2-0.5 MG per film; Place 0.25 Film under the tongue daily.  - [facility] Screen [facility]; Future  - Urine Drug Screen [facility]    The longitudinal plan of care for the diagnosis(es)/condition(s) as documented were addressed during this visit. Due to the added complexity in care, I will continue to support Addison in the subsequent management and with ongoing continuity of care.        BMI  Estimated body mass index is 28.96 kg/m  as calculated from the following:    Height as of this encounter: 1.778 m (5' 10\").    Weight as of this encounter: 91.5 kg (201 lb 12.8 oz).             Reina Jaime is a 40 year old, presenting for the following health issues:  Opioid Refill (Suboxone refill)      6/3/2025     3:46 PM   Additional Questions   Roomed by Nayla BUTLER CMA   Accompanied by self     History of Present Illness       Reason for visit:  Med refill    He eats 0-1 servings of fruits and vegetables daily.He consumes 1 sweetened beverage(s) daily.He exercises with enough effort to increase his heart rate 10 to 19 minutes per day.  He exercises with enough effort to increase his heart rate 5 days per week.   He is taking medications regularly.      Doing well with current dose.  PDMP queried with no concerns.    He is currently taking 1 mg of buprenorphine daily. This is taken in 1 dose(s) daily.     Status Since Last Visit:  Have you used any opioids since your last visit?: no use since last visit  Do you feel that your dose of suboxone is too high or too low? Adequate  Have there been cravings for opioids? No   Any withdrawal symptoms? none    Any side effects from the medication? none  Any alcohol use? yes  Any other recreational drug use? no    Precipitating Factors:  Triggers have been: non-existent   Other Supports:  Do you attend counseling or meet with a therapist? " "No  Do you attend NA or AA meetings? No  Do you have/meet with a sponsor? No  Family and support systems have been: good  What other goals have you been working on (job, family, relationships, etc)?         PDMP Review         Value Time User    State PDMP site checked  Yes 12/4/2024  2:41 PM Can Mayorga MD                        Objective    /75 (BP Location: Right arm, Patient Position: Sitting, Cuff Size: Adult Large)   Pulse 69   Temp 97.6  F (36.4  C) (Tympanic)   Resp 16   Ht 1.778 m (5' 10\")   Wt 91.5 kg (201 lb 12.8 oz)   SpO2 99%   BMI 28.96 kg/m    Body mass index is 28.96 kg/m .  Physical Exam     General:  Alert and oriented, No acute distress.    Eye: Normal conjunctiva.     HENT:  Oral mucosa is moist.     Neck:  Supple.     Respiratory:  Respirations are non-labored.     Cardiovascular:  Normal rate   Musculoskeletal:  Normal gait.     Psychiatric:  Cooperative, Appropriate mood & affect, Normal judgment.             Signed Electronically by: Can Mayorga MD    "

## 2025-08-07 DIAGNOSIS — F39 MOOD DISORDER: ICD-10-CM

## 2025-08-07 RX ORDER — LAMOTRIGINE 200 MG/1
200 TABLET ORAL DAILY
Qty: 90 TABLET | Refills: 3 | Status: SHIPPED | OUTPATIENT
Start: 2025-08-07